# Patient Record
Sex: MALE | Race: WHITE | Employment: OTHER | URBAN - METROPOLITAN AREA
[De-identification: names, ages, dates, MRNs, and addresses within clinical notes are randomized per-mention and may not be internally consistent; named-entity substitution may affect disease eponyms.]

---

## 2021-07-14 DIAGNOSIS — M25.551 RIGHT HIP PAIN: Primary | ICD-10-CM

## 2021-07-15 ENCOUNTER — OFFICE VISIT (OUTPATIENT)
Dept: ORTHOPEDIC SURGERY | Age: 76
End: 2021-07-15
Payer: MEDICARE

## 2021-07-15 VITALS
DIASTOLIC BLOOD PRESSURE: 80 MMHG | HEART RATE: 59 BPM | WEIGHT: 265 LBS | RESPIRATION RATE: 16 BRPM | HEIGHT: 69 IN | TEMPERATURE: 98.3 F | BODY MASS INDEX: 39.25 KG/M2 | SYSTOLIC BLOOD PRESSURE: 148 MMHG

## 2021-07-15 DIAGNOSIS — M16.11 PRIMARY OSTEOARTHRITIS OF RIGHT HIP: Primary | ICD-10-CM

## 2021-07-15 PROCEDURE — G8427 DOCREV CUR MEDS BY ELIG CLIN: HCPCS | Performed by: ORTHOPAEDIC SURGERY

## 2021-07-15 PROCEDURE — G8417 CALC BMI ABV UP PARAM F/U: HCPCS | Performed by: ORTHOPAEDIC SURGERY

## 2021-07-15 PROCEDURE — 99203 OFFICE O/P NEW LOW 30 MIN: CPT | Performed by: ORTHOPAEDIC SURGERY

## 2021-07-15 PROCEDURE — 4040F PNEUMOC VAC/ADMIN/RCVD: CPT | Performed by: ORTHOPAEDIC SURGERY

## 2021-07-15 PROCEDURE — 3017F COLORECTAL CA SCREEN DOC REV: CPT | Performed by: ORTHOPAEDIC SURGERY

## 2021-07-15 PROCEDURE — 4004F PT TOBACCO SCREEN RCVD TLK: CPT | Performed by: ORTHOPAEDIC SURGERY

## 2021-07-15 PROCEDURE — 1123F ACP DISCUSS/DSCN MKR DOCD: CPT | Performed by: ORTHOPAEDIC SURGERY

## 2021-07-15 RX ORDER — ASPIRIN 81 MG/1
81 TABLET, CHEWABLE ORAL DAILY
Status: ON HOLD | COMMUNITY
End: 2021-09-02 | Stop reason: SDUPTHER

## 2021-07-15 RX ORDER — POTASSIUM CHLORIDE 20 MEQ/1
20 TABLET, EXTENDED RELEASE ORAL 2 TIMES DAILY
COMMUNITY

## 2021-07-15 RX ORDER — TIZANIDINE 2 MG/1
2 TABLET ORAL EVERY 6 HOURS PRN
COMMUNITY

## 2021-07-15 RX ORDER — PANTOPRAZOLE SODIUM 40 MG/1
40 TABLET, DELAYED RELEASE ORAL DAILY
COMMUNITY

## 2021-07-15 RX ORDER — DIPHENHYDRAMINE HCL 25 MG
25 CAPSULE ORAL EVERY 6 HOURS PRN
COMMUNITY

## 2021-07-15 RX ORDER — FLUOXETINE HYDROCHLORIDE 20 MG/1
20 CAPSULE ORAL DAILY
COMMUNITY

## 2021-07-15 RX ORDER — OXYBUTYNIN CHLORIDE 10 MG/1
10 TABLET, EXTENDED RELEASE ORAL DAILY
COMMUNITY

## 2021-07-15 RX ORDER — ALBUTEROL SULFATE 0.63 MG/3ML
1 SOLUTION RESPIRATORY (INHALATION) EVERY 6 HOURS PRN
COMMUNITY
End: 2021-08-19

## 2021-07-15 RX ORDER — AMLODIPINE BESYLATE 10 MG/1
10 TABLET ORAL DAILY
COMMUNITY

## 2021-07-15 RX ORDER — BISOPROLOL FUMARATE 10 MG/1
10 TABLET ORAL DAILY
COMMUNITY

## 2021-07-15 RX ORDER — ALPRAZOLAM 0.25 MG/1
0.25 TABLET ORAL NIGHTLY PRN
COMMUNITY

## 2021-07-15 RX ORDER — CHLORTHALIDONE 25 MG/1
25 TABLET ORAL DAILY
COMMUNITY

## 2021-07-15 RX ORDER — ATORVASTATIN CALCIUM 40 MG/1
40 TABLET, FILM COATED ORAL DAILY
COMMUNITY

## 2021-07-15 ASSESSMENT — ENCOUNTER SYMPTOMS
COLOR CHANGE: 0
DIARRHEA: 0
APNEA: 0
SHORTNESS OF BREATH: 0
CHEST TIGHTNESS: 0
VOMITING: 0
NAUSEA: 0
ABDOMINAL PAIN: 0
ABDOMINAL DISTENTION: 0
COUGH: 0
CONSTIPATION: 0

## 2021-07-15 NOTE — PROGRESS NOTES
[facility] [address] [facility]  [address] [place] [address]  Dept: [phone]  Dept Fax: [phone]        Right Hip: New Patient    Subjective:     Chief Complaint   Patient presents with    Hip Pain     R Hip Pain      HPI:     Kody Nunes presents with a history of pain in the right hip for 7 weeks. The patient had a Lupron injection into the hip 7 weeks ago. The pain does radiate into the thigh and into the groin. The pain is not present over the lateral aspect of the hip. The pain is most troublesome during ambulatory activity and now limits the patient`s walking distance to shorter distances. The patient is only able to walk around the house and in and out of restaurants. Night pain, which disturbs the patient`s sleep is not a problem. The patient has had to give up some activities such as walking, bicycling, and playing with the grand kids, which has produced a consequent deterioration in quality of life. He has tried Tylenol, Narcotics, a cane/walker, and reduction of activity and reports no improvement. Back pain is present but not as severe as the hip. The patient has not had a cortisone injection. The patient has not tried physical therapy. The patient has not had surgery with the right hip. The patient had his left hip replaced August 2019 by Dr. Barbara Sullivan. The opposite hip is  okay. Knee pain is not noted. The patient has advanced prostate cancer. The patient is here to discuss a total hip replacement. ROS:     Review of Systems   Constitutional: Positive for activity change. Negative for appetite change, fatigue and fever. Respiratory: Negative for apnea, cough, chest tightness and shortness of breath. Cardiovascular: Negative for chest pain, palpitations and leg swelling. Gastrointestinal: Negative for abdominal distention, abdominal pain, constipation, diarrhea, nausea and vomiting.    Genitourinary: Negative for difficulty urinating, dysuria and hematuria. Musculoskeletal: Positive for arthralgias and gait problem. Negative for joint swelling and myalgias. Skin: Negative for color change and rash. Neurological: Negative for dizziness, weakness, numbness and headaches. Psychiatric/Behavioral: Negative for sleep disturbance. Past Medical History:    No past medical history on file. Past Surgical History:    No past surgical history on file. CurrentMedications:   Current Outpatient Medications   Medication Sig Dispense Refill    atorvastatin (LIPITOR) 40 MG tablet Take 40 mg by mouth daily      ciclopirox (LOPROX) 0.77 % cream Apply topically 2 times daily Apply topically 2 times daily.  diphenhydrAMINE (BENADRYL) 25 MG capsule Take 25 mg by mouth every 6 hours as needed for Itching      pantoprazole (PROTONIX) 40 MG tablet Take 40 mg by mouth daily      ALPRAZolam (XANAX) 0.25 MG tablet Take 0.25 mg by mouth nightly as needed for Sleep.  tiZANidine (ZANAFLEX) 2 MG tablet Take 2 mg by mouth every 6 hours as needed      albuterol (ACCUNEB) 0.63 MG/3ML nebulizer solution Take 1 ampule by nebulization every 6 hours as needed for Wheezing      bisoprolol (ZEBETA) 10 MG tablet Take 10 mg by mouth daily      amLODIPine (NORVASC) 10 MG tablet Take 10 mg by mouth daily      chlorthalidone (HYGROTON) 25 MG tablet Take 25 mg by mouth daily      potassium chloride (KLOR-CON M) 20 MEQ extended release tablet Take 20 mEq by mouth 2 times daily      aspirin 81 MG chewable tablet Take 81 mg by mouth daily      metFORMIN (GLUCOPHAGE) 500 MG tablet Take 500 mg by mouth 2 times daily (with meals)      FLUoxetine (PROZAC) 20 MG capsule Take 20 mg by mouth daily      leuprolide (LUPRON) 45 MG injection Inject 45 mg into the muscle once      oxybutynin (DITROPAN-XL) 10 MG extended release tablet Take 10 mg by mouth daily       No current facility-administered medications for this visit.        Allergies: Patient has no known allergies. Social History:   Social History     Socioeconomic History    Marital status:      Spouse name: Not on file    Number of children: Not on file    Years of education: Not on file    Highest education level: Not on file   Occupational History    Not on file   Tobacco Use    Smoking status: Not on file   Substance and Sexual Activity    Alcohol use: Not on file    Drug use: Not on file    Sexual activity: Not on file   Other Topics Concern    Not on file   Social History Narrative    Not on file     Social Determinants of Health     Financial Resource Strain:     Difficulty of Paying Living Expenses:    Food Insecurity:     Worried About Running Out of Food in the Last Year:     [address] in the Last Year:    Transportation Needs:     Lack of Transportation (Medical):  Lack of Transportation (Non-Medical):    Physical Activity:     Days of Exercise per Week:     Minutes of Exercise per Session:    Stress:     Feeling of Stress :    Social Connections:     Frequency of Communication with Friends and Family:     Frequency of Social Gatherings with Friends and Family:     Attends Judaism Services:     Active Member of Clubs or Organizations:     Attends Club or Organization Meetings:     Marital Status:    Intimate Partner Violence:     Fear of Current or Ex-Partner:     Emotionally Abused:     Physically Abused:     Sexually Abused:        Family History:  No family history on file. Vitals:   BP (!) 148/80   Pulse 59   Temp 98.3 °F (36.8 °C)   Resp 16   Ht 5' 9\" (1.753 m)   Wt 265 lb (120.2 kg)   BMI 39.13 kg/m²  Body mass index is 39.13 kg/m². Physical Examination:     Orthopedics:    GENERAL: Alert and oriented X3 in no acute distress. SKIN: Intact without lesions or ulcerations. NEURO: Intact to sensory and motor testing. VASC: Capillary refill is less than 3 seconds.     Right Hip     GEN: Alert and oriented X 3, in no acute distress. GAIT: The patient's gait was observed while entering the exam room and was noted to be antalgic. The extremity is in anatomic alignment. SKIN: Intact without rashes, lesions, or ulcerations. No obvious deformity or swelling. NEURO: The patient responds to light touch throughout right LE. Patellar and Achilles reflexes are 2/4. VASC: The right LE is neurovascularly intact with 2/4 DP and 2/4 PT pulses. Brisk capillary refill. ROM: 90 degrees flexion, 30 degrees abduction, 10 degrees adduction, 0 degrees of internal rotation, 30 degrees of external rotation. MUSC: Strength is 4/5 hip abduction. PALP: The patient is not tender to palpation over the greater trochanter. TEST: Log roll is positive. 1/16 shorter. (+) Stenchfield test. (+) Impingement test. Pain with FADIR and mild pain with DANILO. Negative Trendelenburg test. Negative Ap's test. (+) C sign. No labral clunks. No pain on compression. Assessment:     1. Primary osteoarthritis of right hip      Procedures:    Procedure: no  Radiology:   X-rays taken today were reviewed with the patient. HIP/PELVIS X-RAY    AP and standing AP of the pelvis and supine AP and frog leg lateral of the Right hip. Radiographs were obtained today and demonstrate joint spacing is near complete loss of joint space. The right hip has sclerosis, cysts, and osteophytes. There is no evidence of fracture, dislocation or other significant abnormality. Impression: Severe osteoarthritis of the right hip. Plan:   Treatment : I reviewed the X-ray with the patient. We discussed the etiologies and natural histories of primary osteoarthritis of the right hip. We discussed the various treatment alternatives including anti-inflammatory medications, physical therapy, injections, further imaging studies and as a last result surgery. During today's visit, I explained to the patient that his x-rays show he has bone on bone arthritis of the right hip.  The only thing that is going to predictably work is with a total hip replacement. The patient has elected in proceeding with a right total hip replacement. The risks/benefits/alternatives and potential complications have been discussed with the patient. We also had a long discussion regarding the procedure itself and expectation of the recovery. All questions and concerns with addressed. Patient was instructed to call our office with any question or concerns prior to the procedure occurs. The patient was given a total hip replacement booklet at today's visit. A physical therapy prescription was not given. Patient should return to the clinic one week before surgery for his pre-operative discussion to follow up with  Theron Sanders PA-C. The patient will call the office immediately with any problems. No orders of the defined types were placed in this encounter. No orders of the defined types were placed in this encounter. IGwyn MS, AT, ATC, am scribing for and in the presence of Gonzales Azar D.O.. 7/17/2021  5:39 PM      Electronically signed by Jose Luis Eden DO, on 7/17/2021 at 5:39 PM         IGonzales DO, have personally seen this patient and I have reviewed the CC, PMH, FHX and Social History as provided by other clinical staff. I reassessed the HPI and ROS as scribed by Gwyn Thomas ATC in my presence and it is both accurate and complete. Thereafter, I personally performed the PE, reviewed the imaging and established the DX and POC. I agree with the documentation provided by the . I have reviewed all documentation in its entirety prior to providing my signature indicating agreement. Any areas of disagreement are noted on the chart.     Electronically signed by Jose Luis Eden DO on 7/17/2021 at 5:39 PM

## 2021-07-21 ENCOUNTER — TELEPHONE (OUTPATIENT)
Dept: ORTHOPEDIC SURGERY | Age: 76
End: 2021-07-21

## 2021-07-21 NOTE — TELEPHONE ENCOUNTER
Called and left VM for patient. Gave the option of schedule an apt with Dr Vick Mattson to discuss moving his DELMY over from [facility]. Asked patient to call back with a decision on how they wish to proceed.

## 2021-08-02 ENCOUNTER — OFFICE VISIT (OUTPATIENT)
Dept: ORTHOPEDIC SURGERY | Age: 76
End: 2021-08-02
Payer: MEDICARE

## 2021-08-02 DIAGNOSIS — M25.551 RIGHT HIP PAIN: Primary | ICD-10-CM

## 2021-08-02 PROCEDURE — G8417 CALC BMI ABV UP PARAM F/U: HCPCS | Performed by: ORTHOPAEDIC SURGERY

## 2021-08-02 PROCEDURE — 1123F ACP DISCUSS/DSCN MKR DOCD: CPT | Performed by: ORTHOPAEDIC SURGERY

## 2021-08-02 PROCEDURE — 3017F COLORECTAL CA SCREEN DOC REV: CPT | Performed by: ORTHOPAEDIC SURGERY

## 2021-08-02 PROCEDURE — 4004F PT TOBACCO SCREEN RCVD TLK: CPT | Performed by: ORTHOPAEDIC SURGERY

## 2021-08-02 PROCEDURE — 99213 OFFICE O/P EST LOW 20 MIN: CPT | Performed by: ORTHOPAEDIC SURGERY

## 2021-08-02 PROCEDURE — G8428 CUR MEDS NOT DOCUMENT: HCPCS | Performed by: ORTHOPAEDIC SURGERY

## 2021-08-02 PROCEDURE — 4040F PNEUMOC VAC/ADMIN/RCVD: CPT | Performed by: ORTHOPAEDIC SURGERY

## 2021-08-02 NOTE — PROGRESS NOTES
Alicia Baldwin M.D.            [address] [facility] [address] [facility],[address], [address] [facility]           Dept Phone: [phone]           Dept Fax:  [phone]           Magalis Yadav          Dept Phone: [phone]           Dept Fax:  [phone]      Chief Compliant:  Chief Complaint   Patient presents with    Pain     Rt hip        History of Present Illness: This is a 77 y.o. male who presents to the clinic today for evaluation / follow up of right hip pain. Cheng Barger is a 36-year-old patient seen for evaluation for relatively increased/acute process of right hip pain. He states as well as his wife who is here with him that in the last 2 to 3 months he has had increasing pain to the point where is difficult for her to ambulate or do anything at all. He denies any injury trauma or falls. Denies any radicular symptoms pain is all groin base. Patiently was recently is seen by Dr. Rigo Rossi who had had him scheduled for right total hip arthroplasty. Patient is noted to have had a previous left total of arthroplasty performed per Dr. Segundo Roland at [facility]. He has no complaints with this. Review of Systems   Constitutional: Negative for fever, chills, sweats. Eyes: Negative for changes in vision, or pain. HENT: Negative for ear ache, epistaxis, or sore throat. Respiratory/Cardio: Negative for Chest pain, palpitations, SOB, or cough. Gastrointestinal: Negative for abdominal pain, N/V/D. Genitourinary: Negative for dysuria, frequency, urgency, or hematuria. Neurological: Negative for headache, numbness, or weakness. Integumentary: Negative for rash, itching, laceration, or abrasion. Musculoskeletal: Positive for Pain (Rt hip)       Physical Exam:  Constitutional: Patient is oriented to person, place, and time.  Patient appears well-developed and well nourished. HENT: Negative otherwise noted  Head: Normocephalic and Atraumatic  Nose: Normal  Eyes: Conjunctivae and EOM are normal  Neck: Normal range of motion Neck supple. Respiratory/Cardio: Effort normal. No respiratory distress. Musculoskeletal: Examination of the patient is noted to have a antalgic gait. He has on limited flexion of his hip up to 70 degrees. Internal rotation is limited to about 10 degrees with a positive FADIR. External rotation limited about 25 degrees patient is unable to get his foot up to tie his shoe. He has no trochanteric findings no IT band findings distally. No significant leg length discrepancy is appreciated. Neurological: Patient is alert and oriented to person, place, and time. Normal strenght. No sensory deficit. Skin: Skin is warm and dry  Psychiatric: Behavior is normal. Thought content normal.  Nursing note and vitals reviewed. Labs and Imaging:     XR taken on July 15 of this year showed multiple views of the patient's pelvis and right hip. Patient has a previous left total of arthroplasty component appears to be in good position. Patient is noted to have significant joint space narrowing of the right hip approaching bone-on-bone apposition. He also has cam and pincer osteophytes. No evidence for avascular process. No orders of the defined types were placed in this encounter. Assessment and Plan:  1. Right hip pain    2. Severe DJD right hip  3. Status post left total hip arthroplasty per Dr. Sara White      This is a 77 y.o. male who presents to the clinic today for evaluation / follow up of significant right hip pain/severe DJD right hip    Past History:    Current Outpatient Medications:     atorvastatin (LIPITOR) 40 MG tablet, Take 40 mg by mouth daily, Disp: , Rfl:     ciclopirox (LOPROX) 0.77 % cream, Apply topically 2 times daily Apply topically 2 times daily. , Disp: , Rfl:     diphenhydrAMINE (BENADRYL) 25 MG capsule, Take 25 mg by mouth every 6 hours as needed for Itching, Disp: , Rfl:     pantoprazole (PROTONIX) 40 MG tablet, Take 40 mg by mouth daily, Disp: , Rfl:     ALPRAZolam (XANAX) 0.25 MG tablet, Take 0.25 mg by mouth nightly as needed for Sleep., Disp: , Rfl:     tiZANidine (ZANAFLEX) 2 MG tablet, Take 2 mg by mouth every 6 hours as needed, Disp: , Rfl:     albuterol (ACCUNEB) 0.63 MG/3ML nebulizer solution, Take 1 ampule by nebulization every 6 hours as needed for Wheezing, Disp: , Rfl:     bisoprolol (ZEBETA) 10 MG tablet, Take 10 mg by mouth daily, Disp: , Rfl:     amLODIPine (NORVASC) 10 MG tablet, Take 10 mg by mouth daily, Disp: , Rfl:     chlorthalidone (HYGROTON) 25 MG tablet, Take 25 mg by mouth daily, Disp: , Rfl:     potassium chloride (KLOR-CON M) 20 MEQ extended release tablet, Take 20 mEq by mouth 2 times daily, Disp: , Rfl:     aspirin 81 MG chewable tablet, Take 81 mg by mouth daily, Disp: , Rfl:     metFORMIN (GLUCOPHAGE) 500 MG tablet, Take 500 mg by mouth 2 times daily (with meals), Disp: , Rfl:     FLUoxetine (PROZAC) 20 MG capsule, Take 20 mg by mouth daily, Disp: , Rfl:     leuprolide (LUPRON) 45 MG injection, Inject 45 mg into the muscle once, Disp: , Rfl:     oxybutynin (DITROPAN-XL) 10 MG extended release tablet, Take 10 mg by mouth daily, Disp: , Rfl:   No Known Allergies  Social History     Socioeconomic History    Marital status:      Spouse name: Not on file    Number of children: Not on file    Years of education: Not on file    Highest education level: Not on file   Occupational History    Not on file   Tobacco Use    Smoking status: Not on file   Substance and Sexual Activity    Alcohol use: Not on file    Drug use: Not on file    Sexual activity: Not on file   Other Topics Concern    Not on file   Social History Narrative    Not on file     Social Determinants of Health     Financial Resource Strain:     Difficulty of Paying Living Expenses:    Food Insecurity:     Worried About [address] [facility] in the Last Year:    [address] in the Last Year:    Transportation Needs:     Lack of Transportation (Medical):  Lack of Transportation (Non-Medical):    Physical Activity:     Days of Exercise per Week:     Minutes of Exercise per Session:    Stress:     Feeling of Stress :    Social Connections:     Frequency of Communication with Friends and Family:     Frequency of Social Gatherings with Friends and Family:     Attends Evangelical Services:     Active Member of Clubs or Organizations:     Attends Club or Organization Meetings:     Marital Status:    Intimate Partner Violence:     Fear of Current or Ex-Partner:     Emotionally Abused:     Physically Abused:     Sexually Abused:      No past medical history on file. No past surgical history on file. No family history on file. Plan  She states as well as his wife that he is having significant problems with his hip to the point where he cannot do anything other than sit in the chair. Patient is willing and wishing to have a right total of arthroplasty performed. He is aware of risk and benefits having been through this before on his contralateral left side. Patient does have a history of type 2 diabetes. He is also receiving Lupron injections for his prostate. Patient does wish to proceed with surgery on an urgent basis this is really beginning to affect his daily activities significantly    Provider Attestation:  Palmira Mendoza, personally performed the services described in this documentation. All medical record entries made by the scribe were at my direction and in my presence. I have reviewed the chart and discharge instructions and agree that the records reflect my personal performance and is accurate and complete. Vincent Goodell, MD. 08/02/21      Please note that this chart was generated using voice recognition Dragon dictation software.   Although every effort was made to ensure the accuracy of this automated transcription, some errors in transcription may have occurred.

## 2021-08-19 ENCOUNTER — HOSPITAL ENCOUNTER (OUTPATIENT)
Dept: PREADMISSION TESTING | Age: 76
Discharge: HOME | End: 2021-08-23
Payer: MEDICARE

## 2021-08-19 VITALS
TEMPERATURE: 97.5 F | WEIGHT: 265 LBS | HEART RATE: 60 BPM | BODY MASS INDEX: 39.25 KG/M2 | RESPIRATION RATE: 18 BRPM | SYSTOLIC BLOOD PRESSURE: 131 MMHG | HEIGHT: 69 IN | OXYGEN SATURATION: 97 % | DIASTOLIC BLOOD PRESSURE: 71 MMHG

## 2021-08-19 LAB
-: ABNORMAL
ABO/RH: NORMAL
ABSOLUTE EOS #: 0.2 K/UL (ref 0–0.4)
ABSOLUTE IMMATURE GRANULOCYTE: ABNORMAL K/UL (ref 0–0.3)
ABSOLUTE LYMPH #: 1.8 K/UL (ref 1–4.8)
ABSOLUTE MONO #: 0.6 K/UL (ref 0.1–1.3)
AMORPHOUS: ABNORMAL
ANION GAP SERPL CALCULATED.3IONS-SCNC: 12 MMOL/L (ref 9–17)
ANTIBODY SCREEN: NEGATIVE
ARM BAND NUMBER: NORMAL
BACTERIA: ABNORMAL
BASOPHILS # BLD: 3 % (ref 0–2)
BASOPHILS ABSOLUTE: 0.1 K/UL (ref 0–0.2)
BILIRUBIN URINE: ABNORMAL
BLOOD BANK COMMENT: NORMAL
BUN BLDV-MCNC: 20 MG/DL (ref 8–23)
BUN/CREAT BLD: ABNORMAL (ref 9–20)
CALCIUM SERPL-MCNC: 9.4 MG/DL (ref 8.6–10.4)
CASTS UA: ABNORMAL /LPF
CHLORIDE BLD-SCNC: 103 MMOL/L (ref 98–107)
CO2: 29 MMOL/L (ref 20–31)
COLOR: YELLOW
COMMENT UA: ABNORMAL
CREAT SERPL-MCNC: 1.22 MG/DL (ref 0.7–1.2)
CRYSTALS, UA: ABNORMAL /HPF
DIFFERENTIAL TYPE: ABNORMAL
EOSINOPHILS RELATIVE PERCENT: 4 % (ref 0–4)
EPITHELIAL CELLS UA: ABNORMAL /HPF
EXPIRATION DATE: NORMAL
GFR AFRICAN AMERICAN: >60 ML/MIN
GFR NON-AFRICAN AMERICAN: 58 ML/MIN
GFR SERPL CREATININE-BSD FRML MDRD: ABNORMAL ML/MIN/{1.73_M2}
GFR SERPL CREATININE-BSD FRML MDRD: ABNORMAL ML/MIN/{1.73_M2}
GLUCOSE BLD-MCNC: 178 MG/DL (ref 70–99)
GLUCOSE URINE: ABNORMAL
HCT VFR BLD CALC: 42.9 % (ref 41–53)
HEMOGLOBIN: 14.9 G/DL (ref 13.5–17.5)
IMMATURE GRANULOCYTES: ABNORMAL %
KETONES, URINE: ABNORMAL
LEUKOCYTE ESTERASE, URINE: NEGATIVE
LYMPHOCYTES # BLD: 34 % (ref 24–44)
MCH RBC QN AUTO: 32.5 PG (ref 26–34)
MCHC RBC AUTO-ENTMCNC: 34.7 G/DL (ref 31–37)
MCV RBC AUTO: 93.6 FL (ref 80–100)
MONOCYTES # BLD: 11 % (ref 1–7)
MUCUS: ABNORMAL
NITRITE, URINE: NEGATIVE
NRBC AUTOMATED: ABNORMAL PER 100 WBC
OTHER OBSERVATIONS UA: ABNORMAL
PDW BLD-RTO: 12.9 % (ref 11.5–14.9)
PH UA: 5.5 (ref 5–8)
PLATELET # BLD: 190 K/UL (ref 150–450)
PLATELET ESTIMATE: ABNORMAL
PMV BLD AUTO: 9.2 FL (ref 6–12)
POTASSIUM SERPL-SCNC: 3.4 MMOL/L (ref 3.7–5.3)
PROTEIN UA: NEGATIVE
RBC # BLD: 4.58 M/UL (ref 4.5–5.9)
RBC # BLD: ABNORMAL 10*6/UL
RBC UA: ABNORMAL /HPF
RENAL EPITHELIAL, UA: ABNORMAL /HPF
SEG NEUTROPHILS: 48 % (ref 36–66)
SEGMENTED NEUTROPHILS ABSOLUTE COUNT: 2.5 K/UL (ref 1.3–9.1)
SODIUM BLD-SCNC: 144 MMOL/L (ref 135–144)
SPECIFIC GRAVITY UA: 1.03 (ref 1–1.03)
TRICHOMONAS: ABNORMAL
TURBIDITY: CLEAR
URINE HGB: NEGATIVE
UROBILINOGEN, URINE: NORMAL
WBC # BLD: 5.2 K/UL (ref 3.5–11)
WBC # BLD: ABNORMAL 10*3/UL
WBC UA: ABNORMAL /HPF
YEAST: ABNORMAL

## 2021-08-19 PROCEDURE — 86900 BLOOD TYPING SEROLOGIC ABO: CPT

## 2021-08-19 PROCEDURE — 80048 BASIC METABOLIC PNL TOTAL CA: CPT

## 2021-08-19 PROCEDURE — 36415 COLL VENOUS BLD VENIPUNCTURE: CPT

## 2021-08-19 PROCEDURE — 87641 MR-STAPH DNA AMP PROBE: CPT

## 2021-08-19 PROCEDURE — 86850 RBC ANTIBODY SCREEN: CPT

## 2021-08-19 PROCEDURE — 86901 BLOOD TYPING SEROLOGIC RH(D): CPT

## 2021-08-19 PROCEDURE — 93005 ELECTROCARDIOGRAM TRACING: CPT | Performed by: ANESTHESIOLOGY

## 2021-08-19 PROCEDURE — 85025 COMPLETE CBC W/AUTO DIFF WBC: CPT

## 2021-08-19 PROCEDURE — 81001 URINALYSIS AUTO W/SCOPE: CPT

## 2021-08-19 RX ORDER — CELECOXIB 200 MG/1
200 CAPSULE ORAL DAILY
Status: ON HOLD | COMMUNITY
End: 2021-09-02 | Stop reason: HOSPADM

## 2021-08-19 RX ORDER — IPRATROPIUM BROMIDE 42 UG/1
2 SPRAY, METERED NASAL 4 TIMES DAILY PRN
COMMUNITY

## 2021-08-19 RX ORDER — HYDROCODONE BITARTRATE AND ACETAMINOPHEN 5; 325 MG/1; MG/1
1 TABLET ORAL EVERY 6 HOURS PRN
Status: ON HOLD | COMMUNITY
End: 2021-09-02 | Stop reason: HOSPADM

## 2021-08-19 ASSESSMENT — ENCOUNTER SYMPTOMS
RESPIRATORY NEGATIVE: 1
GASTROINTESTINAL NEGATIVE: 1

## 2021-08-19 NOTE — PROGRESS NOTES
Dr. Elise Rider, anesthesia, was contacted and informed of the patient's planned surgery, history, and unconfirmed abnormal EKG results from EKG done today in [facility]. Medical clearance required. Surgery scheduling will notify Dr. Theodore Son office who will be responsible for making sure the clearance is obtained and is in the chart for surgery.

## 2021-08-19 NOTE — H&P
[id number]       NAME:  Ron Fonseca  MRN: [id number]   YOB: 1945   Date: 8/19/2021   Age: 77 y.o. Gender: male     COMPLAINT AND PRESENT HISTORY:   Ron Fonseca is 77 y.o.,  male, presents for pre-anesthesia/admission testing for HIP TOTAL ARTHROPLASTY W/ASI Right. per Dr. Radha Temple    Primary dx: DJD RIGHT HIP     HPI:  Patient C/O of pain, stiffness, no popping and cracking in the right Hip with limitation in the ROM. Pt describes his pain as sharp pain radiated to his right  lateral thigh , pain rated 9/10. Pt denies numbness or tingling in his right leg. The Hip does not lock up, No recent falls or trauma. No redness, swelling or rashes. Pt stats, pain started in the last May but it getting worse after he had a lupron injection for prostate cancer into his right hip 7 weeks ago. Pt states he was very active walking, bicycling and playing with his grand kids but now he could not do it. Pain aggravated by walking or standing even around the house, pain decrease by sitting. Pt used oxycodone PRN without any improvement of the pain. Pt refused cortisone injection and he want to do the surgery, pt had steroid injection in his left hip which it did not improve his pain for long time and end up with left hip replacement 2019. Pt denies fever/chills, chest pain or SOB,no palpitation or headache. Testing completed r/t condition:  XR HIP RIGHT MIN 4VW W PELVIS  Narrative   HIP/PELVIS X-RAY       AP and standing AP of the pelvis and supine AP and frog leg lateral of the    Right hip. Radiographs were obtained today and demonstrate joint spacing    is near complete loss of joint space. The right hip has sclerosis, cysts,    and osteophytes. There is no evidence of fracture, dislocation or other    significant abnormality.         Impression: Severe osteoarthritis of the right hip.          Review of additional significant medical hx:  Asthma ,Basal cell carcinoma of face ,GERD, HTN, Hyperlipidemia    Sleep apnea (bipap ), Malignant neoplasm of prostate, diabetes mellitus type two    Medication r/t condition:  atorvastatin (LIPITOR) 40 MG tablet, pantoprazole (PROTONIX) 40 MG tablet, albuterol (ACCUNEB) 0.63 MG/3ML nebulizer solution,  amLODIPine (NORVASC) 10 MG tablet, aspirin 81 MG chewable tablet,  metFORMIN (GLUCOPHAGE) 500 MG tablet    Denies hx of MRSA infection. Denies hx of blood clots legs/ lungs  Denies hx of any personal or family hx of complications w/anesthesia. Pt is on anticoagulant medication : baby aspirin 81 mg       BP Readings from Last 3 Encounters:   08/19/21 131/71   07/15/21 (!) 148/80   11/13/15 (!) 147/92     Pulse Readings from Last 3 Encounters:   08/19/21 60   07/15/21 59   11/13/15 97       Clearance requested per anesthesiologist:   Dr. Damian Spears, anesthesia, was contacted and informed of the patient's planned surgery, history, and unconfirmed abnormal EKG results from EKG done today in [facility]. Medical clearance required. Surgery scheduling will notify Dr. Efrain Lo office who will be responsible for making sure the clearance is obtained and is in the chart for surgery.     PAST MEDICAL HISTORY     Past Medical History:   Diagnosis Date    Anxiety     Arthritis     Cancer [facility])     prostate cancer, radiation treatment    Chronic back pain     Depression     DJD (degenerative joint disease)     GERD (gastroesophageal reflux disease)     Hyperlipidemia     Hypertension     Irregular heart beat     Overactive bladder     Pre-diabetes     Skin cancer, basal cell     Sleep apnea     Bipap nightly       SURGICAL HISTORY       Past Surgical History:   Procedure Laterality Date    COLONOSCOPY      ENDOSCOPY, COLON, DIAGNOSTIC      EGD    JOINT REPLACEMENT Left 2019    total hip    KNEE ARTHROSCOPY Left     LIPOMA RESECTION      x 3    PROSTATE BIOPSY      SKIN BIOPSY      x 2    VASECTOMY spermacelectomy       SOCIAL HISTORY       Social History     Socioeconomic History    Marital status:      Spouse name: None    Number of children: None    Years of education: None    Highest education level: None   Occupational History    None   Tobacco Use    Smoking status: Former Smoker     Quit date:      Years since quittin.6    Smokeless tobacco: Never Used   Substance and Sexual Activity    Alcohol use: Yes     Comment: 4 times weekly    Drug use: None     Comment: medical marijuana card (CBD & THC oil)    Sexual activity: None   Other Topics Concern    None   Social History Narrative    None     Social Determinants of Health     Financial Resource Strain:     Difficulty of Paying Living Expenses:    Food Insecurity:     Worried About Running Out of Food in the Last Year:     Ran Out of Food in the Last Year:    Transportation Needs:     Lack of Transportation (Medical):  Lack of Transportation (Non-Medical):    Physical Activity:     Days of Exercise per Week:     Minutes of Exercise per Session:    Stress:     Feeling of Stress :    Social Connections:     Frequency of Communication with Friends and Family:     Frequency of Social Gatherings with Friends and Family:     Attends Denominational Services:     Active Member of Clubs or Organizations:     Attends Club or Organization Meetings:     Marital Status:    Intimate Partner Violence:     Fear of Current or Ex-Partner:     Emotionally Abused:     Physically Abused:     Sexually Abused:        REVIEW OF SYSTEMS    No Known Allergies    Current Outpatient Medications on File Prior to Encounter   Medication Sig Dispense Refill    celecoxib (CELEBREX) 200 MG capsule Take 200 mg by mouth daily      HYDROcodone-acetaminophen (NORCO) 5-325 MG per tablet Take 1 tablet by mouth every 6 hours as needed for Pain.       ipratropium (ATROVENT) 0.06 % nasal spray 2 sprays by Each Nostril route 4 times daily as needed for Rhinitis      atorvastatin (LIPITOR) 40 MG tablet Take 40 mg by mouth daily      ciclopirox (LOPROX) 0.77 % cream Apply topically 2 times daily Apply topically 2 times daily.  diphenhydrAMINE (BENADRYL) 25 MG capsule Take 25 mg by mouth every 6 hours as needed for Itching      pantoprazole (PROTONIX) 40 MG tablet Take 40 mg by mouth daily      ALPRAZolam (XANAX) 0.25 MG tablet Take 0.25 mg by mouth nightly as needed for Sleep.  tiZANidine (ZANAFLEX) 2 MG tablet Take 2 mg by mouth every 6 hours as needed      bisoprolol (ZEBETA) 10 MG tablet Take 10 mg by mouth daily      amLODIPine (NORVASC) 10 MG tablet Take 10 mg by mouth daily      chlorthalidone (HYGROTON) 25 MG tablet Take 25 mg by mouth daily      potassium chloride (KLOR-CON M) 20 MEQ extended release tablet Take 20 mEq by mouth 2 times daily      aspirin 81 MG chewable tablet Take 81 mg by mouth daily      metFORMIN (GLUCOPHAGE) 500 MG tablet Take 500 mg by mouth 2 times daily (with meals)      FLUoxetine (PROZAC) 20 MG capsule Take 20 mg by mouth daily      leuprolide (LUPRON) 45 MG injection Inject 45 mg into the muscle once      oxybutynin (DITROPAN-XL) 10 MG extended release tablet Take 10 mg by mouth daily       No current facility-administered medications on file prior to encounter. Review of Systems   Constitutional: Positive for activity change. HENT: Negative. Respiratory: Negative. Sleep apnea , using BiPAP mache at night    Cardiovascular: Negative. Pt has history of  arrhythmia due to decreased potasium    Gastrointestinal: Negative. Genitourinary: Negative. Musculoskeletal: Positive for gait problem. Right hip pain    Skin: Negative. Has hx of Basal cell carcinoma of face   Neurological: Positive for dizziness. Negative for numbness. Hematological: Negative. Psychiatric/Behavioral: Positive for agitation.        GENERAL PHYSICAL EXAM     Vitals: /71   Pulse 60 Temp 97.5 °F (36.4 °C)   Resp 18   Ht 5' 9\" (1.753 m)   Wt 265 lb (120.2 kg)   SpO2 97%   BMI 39.13 kg/m²               Physical Exam  Constitutional:       Appearance: Normal appearance. HENT:      Head: Normocephalic. Right Ear: External ear normal.      Left Ear: External ear normal.      Nose: Nose normal.      Mouth/Throat:      Mouth: Mucous membranes are moist.   Eyes:      General:         Right eye: No discharge. Left eye: No discharge. Pupils: Pupils are equal, round, and reactive to light. Cardiovascular:      Rate and Rhythm: Normal rate and regular rhythm. Pulses: Normal pulses. Radial pulses are 2+ on the right side and 2+ on the left side. Dorsalis pedis pulses are 2+ on the right side and 2+ on the left side. Posterior tibial pulses are 2+ on the right side and 2+ on the left side. Heart sounds: Normal heart sounds. No murmur heard. No gallop. Pulmonary:      Effort: Pulmonary effort is normal.      Breath sounds: Normal breath sounds. No wheezing or rales. Abdominal:      General: Bowel sounds are normal. There is no distension. Tenderness: There is no abdominal tenderness. Comments: Obese     Musculoskeletal:         General: Normal range of motion. Cervical back: Normal range of motion and neck supple. Right lower leg: No edema. Left lower leg: No edema. Comments: Tenderness on motion/ rotation of the Rt Hip joint with limitation in the ROM. Pt is using a cane    Skin:     General: Skin is warm and dry. Findings: No bruising or erythema. Neurological:      General: No focal deficit present. Mental Status: He is alert and oriented to person, place, and time.    Psychiatric:         Mood and Affect: Mood normal.         Behavior: Behavior normal.       LAB REVIEW     Lab Results   Component Value Date    WBC 5.2 08/19/2021    HGB 14.9 08/19/2021    HCT 42.9 08/19/2021    MCV 93.6 08/19/2021  08/19/2021     Lab Results   Component Value Date     08/19/2021    K 3.4 08/19/2021     08/19/2021    CO2 29 08/19/2021    BUN 20 08/19/2021    CREATININE 1.22 08/19/2021    GLUCOSE 178 08/19/2021    GLUCOSE 130 03/20/2012    CALCIUM 9.4 08/19/2021          SURGERY / PROVISIONAL DIAGNOSES:      DJD RIGHT HIP   HIP TOTAL ARTHROPLASTY W/ASI  RIGHT    There are no problems to display for this patient.         Patricio Farooq, APRN - CNP on 8/19/2021 at 9:16 AM

## 2021-08-19 NOTE — H&P (VIEW-ONLY)
[facility]       NAME:  Cesar Guzman  MRN: [id number]   YOB: 1945   Date: 8/19/2021   Age: 77 y.o. Gender: male     COMPLAINT AND PRESENT HISTORY:   Cesar Guzman is 77 y.o.,  male, presents for pre-anesthesia/admission testing for HIP TOTAL ARTHROPLASTY W/ASI Right. per Dr. Elly Bhat    Primary dx: DJD RIGHT HIP     HPI:  Patient C/O of pain, stiffness, no popping and cracking in the right Hip with limitation in the ROM. Pt describes his pain as sharp pain radiated to his right  lateral thigh , pain rated 9/10. Pt denies numbness or tingling in his right leg. The Hip does not lock up, No recent falls or trauma. No redness, swelling or rashes. Pt stats, pain started in the last May but it getting worse after he had a lupron injection for prostate cancer into his right hip 7 weeks ago. Pt states he was very active walking, bicycling and playing with his grand kids but now he could not do it. Pain aggravated by walking or standing even around the house, pain decrease by sitting. Pt used oxycodone PRN without any improvement of the pain. Pt refused cortisone injection and he want to do the surgery, pt had steroid injection in his left hip which it did not improve his pain for long time and end up with left hip replacement 2019. Pt denies fever/chills, chest pain or SOB,no palpitation or headache. Testing completed r/t condition:  XR HIP RIGHT MIN 4VW W PELVIS  Narrative   HIP/PELVIS X-RAY       AP and standing AP of the pelvis and supine AP and frog leg lateral of the    Right hip. Radiographs were obtained today and demonstrate joint spacing    is near complete loss of joint space. The right hip has sclerosis, cysts,    and osteophytes. There is no evidence of fracture, dislocation or other    significant abnormality.         Impression: Severe osteoarthritis of the right hip.          Review of additional significant medical hx:  Asthma ,Basal cell carcinoma of face ,GERD, HTN, Hyperlipidemia    Sleep apnea (bipap ), Malignant neoplasm of prostate, diabetes mellitus type two    Medication r/t condition:  atorvastatin (LIPITOR) 40 MG tablet, pantoprazole (PROTONIX) 40 MG tablet, albuterol (ACCUNEB) 0.63 MG/3ML nebulizer solution,  amLODIPine (NORVASC) 10 MG tablet, aspirin 81 MG chewable tablet,  metFORMIN (GLUCOPHAGE) 500 MG tablet    Denies hx of MRSA infection. Denies hx of blood clots legs/ lungs  Denies hx of any personal or family hx of complications w/anesthesia. Pt is on anticoagulant medication : baby aspirin 81 mg       BP Readings from Last 3 Encounters:   08/19/21 131/71   07/15/21 (!) 148/80   11/13/15 (!) 147/92     Pulse Readings from Last 3 Encounters:   08/19/21 60   07/15/21 59   11/13/15 97       Clearance requested per anesthesiologist:   Dr. Keith Alcantara, anesthesia, was contacted and informed of the patient's planned surgery, history, and unconfirmed abnormal EKG results from EKG done today in [facility]. Medical clearance required. Surgery scheduling will notify Dr. Stephanie Warren office who will be responsible for making sure the clearance is obtained and is in the chart for surgery.     PAST MEDICAL HISTORY     Past Medical History:   Diagnosis Date    Anxiety     Arthritis     Cancer [facility])     prostate cancer, radiation treatment    Chronic back pain     Depression     DJD (degenerative joint disease)     GERD (gastroesophageal reflux disease)     Hyperlipidemia     Hypertension     Irregular heart beat     Overactive bladder     Pre-diabetes     Skin cancer, basal cell     Sleep apnea     Bipap nightly       SURGICAL HISTORY       Past Surgical History:   Procedure Laterality Date    COLONOSCOPY      ENDOSCOPY, COLON, DIAGNOSTIC      EGD    JOINT REPLACEMENT Left 2019    total hip    KNEE ARTHROSCOPY Left     LIPOMA RESECTION      x 3    PROSTATE BIOPSY      SKIN BIOPSY      x 2    VASECTOMY spermacelectomy       SOCIAL HISTORY       Social History     Socioeconomic History    Marital status:      Spouse name: None    Number of children: None    Years of education: None    Highest education level: None   Occupational History    None   Tobacco Use    Smoking status: Former Smoker     Quit date:      Years since quittin.6    Smokeless tobacco: Never Used   Substance and Sexual Activity    Alcohol use: Yes     Comment: 4 times weekly    Drug use: None     Comment: medical marijuana card (CBD & THC oil)    Sexual activity: None   Other Topics Concern    None   Social History Narrative    None     Social Determinants of Health     Financial Resource Strain:     Difficulty of Paying Living Expenses:    Food Insecurity:     Worried About Running Out of Food in the Last Year:     Ran Out of Food in the Last Year:    Transportation Needs:     Lack of Transportation (Medical):  Lack of Transportation (Non-Medical):    Physical Activity:     Days of Exercise per Week:     Minutes of Exercise per Session:    Stress:     Feeling of Stress :    Social Connections:     Frequency of Communication with Friends and Family:     Frequency of Social Gatherings with Friends and Family:     Attends Confucianist Services:     Active Member of Clubs or Organizations:     Attends Club or Organization Meetings:     Marital Status:    Intimate Partner Violence:     Fear of Current or Ex-Partner:     Emotionally Abused:     Physically Abused:     Sexually Abused:        REVIEW OF SYSTEMS    No Known Allergies    Current Outpatient Medications on File Prior to Encounter   Medication Sig Dispense Refill    celecoxib (CELEBREX) 200 MG capsule Take 200 mg by mouth daily      HYDROcodone-acetaminophen (NORCO) 5-325 MG per tablet Take 1 tablet by mouth every 6 hours as needed for Pain.       ipratropium (ATROVENT) 0.06 % nasal spray 2 sprays by Each Nostril route 4 times daily as needed for Rhinitis      atorvastatin (LIPITOR) 40 MG tablet Take 40 mg by mouth daily      ciclopirox (LOPROX) 0.77 % cream Apply topically 2 times daily Apply topically 2 times daily.  diphenhydrAMINE (BENADRYL) 25 MG capsule Take 25 mg by mouth every 6 hours as needed for Itching      pantoprazole (PROTONIX) 40 MG tablet Take 40 mg by mouth daily      ALPRAZolam (XANAX) 0.25 MG tablet Take 0.25 mg by mouth nightly as needed for Sleep.  tiZANidine (ZANAFLEX) 2 MG tablet Take 2 mg by mouth every 6 hours as needed      bisoprolol (ZEBETA) 10 MG tablet Take 10 mg by mouth daily      amLODIPine (NORVASC) 10 MG tablet Take 10 mg by mouth daily      chlorthalidone (HYGROTON) 25 MG tablet Take 25 mg by mouth daily      potassium chloride (KLOR-CON M) 20 MEQ extended release tablet Take 20 mEq by mouth 2 times daily      aspirin 81 MG chewable tablet Take 81 mg by mouth daily      metFORMIN (GLUCOPHAGE) 500 MG tablet Take 500 mg by mouth 2 times daily (with meals)      FLUoxetine (PROZAC) 20 MG capsule Take 20 mg by mouth daily      leuprolide (LUPRON) 45 MG injection Inject 45 mg into the muscle once      oxybutynin (DITROPAN-XL) 10 MG extended release tablet Take 10 mg by mouth daily       No current facility-administered medications on file prior to encounter. Review of Systems   Constitutional: Positive for activity change. HENT: Negative. Respiratory: Negative. Sleep apnea , using BiPAP mache at night    Cardiovascular: Negative. Pt has history of  arrhythmia due to decreased potasium    Gastrointestinal: Negative. Genitourinary: Negative. Musculoskeletal: Positive for gait problem. Right hip pain    Skin: Negative. Has hx of Basal cell carcinoma of face   Neurological: Positive for dizziness. Negative for numbness. Hematological: Negative. Psychiatric/Behavioral: Positive for agitation.        GENERAL PHYSICAL EXAM     Vitals: /71   Pulse 60 Temp 97.5 °F (36.4 °C)   Resp 18   Ht 5' 9\" (1.753 m)   Wt 265 lb (120.2 kg)   SpO2 97%   BMI 39.13 kg/m²               Physical Exam  Constitutional:       Appearance: Normal appearance. HENT:      Head: Normocephalic. Right Ear: External ear normal.      Left Ear: External ear normal.      Nose: Nose normal.      Mouth/Throat:      Mouth: Mucous membranes are moist.   Eyes:      General:         Right eye: No discharge. Left eye: No discharge. Pupils: Pupils are equal, round, and reactive to light. Cardiovascular:      Rate and Rhythm: Normal rate and regular rhythm. Pulses: Normal pulses. Radial pulses are 2+ on the right side and 2+ on the left side. Dorsalis pedis pulses are 2+ on the right side and 2+ on the left side. Posterior tibial pulses are 2+ on the right side and 2+ on the left side. Heart sounds: Normal heart sounds. No murmur heard. No gallop. Pulmonary:      Effort: Pulmonary effort is normal.      Breath sounds: Normal breath sounds. No wheezing or rales. Abdominal:      General: Bowel sounds are normal. There is no distension. Tenderness: There is no abdominal tenderness. Comments: Obese     Musculoskeletal:         General: Normal range of motion. Cervical back: Normal range of motion and neck supple. Right lower leg: No edema. Left lower leg: No edema. Comments: Tenderness on motion/ rotation of the Rt Hip joint with limitation in the ROM. Pt is using a cane    Skin:     General: Skin is warm and dry. Findings: No bruising or erythema. Neurological:      General: No focal deficit present. Mental Status: He is alert and oriented to person, place, and time.    Psychiatric:         Mood and Affect: Mood normal.         Behavior: Behavior normal.       LAB REVIEW     Lab Results   Component Value Date    WBC 5.2 08/19/2021    HGB 14.9 08/19/2021    HCT 42.9 08/19/2021    MCV 93.6 08/19/2021  08/19/2021     Lab Results   Component Value Date     08/19/2021    K 3.4 08/19/2021     08/19/2021    CO2 29 08/19/2021    BUN 20 08/19/2021    CREATININE 1.22 08/19/2021    GLUCOSE 178 08/19/2021    GLUCOSE 130 03/20/2012    CALCIUM 9.4 08/19/2021          SURGERY / PROVISIONAL DIAGNOSES:      DJD RIGHT HIP   HIP TOTAL ARTHROPLASTY W/ASI  RIGHT    There are no problems to display for this patient.         Mariam Holt, APRN - CNP on 8/19/2021 at 9:16 AM

## 2021-08-20 LAB
EKG ATRIAL RATE: 62 BPM
EKG P AXIS: 44 DEGREES
EKG P-R INTERVAL: 160 MS
EKG Q-T INTERVAL: 448 MS
EKG QRS DURATION: 84 MS
EKG QTC CALCULATION (BAZETT): 454 MS
EKG R AXIS: -4 DEGREES
EKG T AXIS: -28 DEGREES
EKG VENTRICULAR RATE: 62 BPM
MRSA, DNA, NASAL: NORMAL
SPECIMEN DESCRIPTION: NORMAL

## 2021-09-01 ENCOUNTER — ANESTHESIA EVENT (OUTPATIENT)
Dept: OPERATING ROOM | Age: 76
End: 2021-09-01
Payer: MEDICARE

## 2021-09-02 ENCOUNTER — APPOINTMENT (OUTPATIENT)
Dept: GENERAL RADIOLOGY | Age: 76
End: 2021-09-02
Attending: ORTHOPAEDIC SURGERY
Payer: MEDICARE

## 2021-09-02 ENCOUNTER — HOSPITAL ENCOUNTER (OUTPATIENT)
Age: 76
Discharge: HOME | End: 2021-09-02
Attending: ORTHOPAEDIC SURGERY | Admitting: ORTHOPAEDIC SURGERY
Payer: MEDICARE

## 2021-09-02 ENCOUNTER — ANESTHESIA (OUTPATIENT)
Dept: OPERATING ROOM | Age: 76
End: 2021-09-02
Payer: MEDICARE

## 2021-09-02 VITALS — OXYGEN SATURATION: 99 % | DIASTOLIC BLOOD PRESSURE: 60 MMHG | TEMPERATURE: 95.4 F | SYSTOLIC BLOOD PRESSURE: 131 MMHG

## 2021-09-02 VITALS
BODY MASS INDEX: 39.25 KG/M2 | DIASTOLIC BLOOD PRESSURE: 72 MMHG | HEIGHT: 69 IN | HEART RATE: 75 BPM | RESPIRATION RATE: 16 BRPM | TEMPERATURE: 97.3 F | SYSTOLIC BLOOD PRESSURE: 140 MMHG | WEIGHT: 265 LBS | OXYGEN SATURATION: 93 %

## 2021-09-02 DIAGNOSIS — M16.11 PRIMARY OSTEOARTHRITIS OF RIGHT HIP: Primary | ICD-10-CM

## 2021-09-02 LAB
GLUCOSE BLD-MCNC: 176 MG/DL (ref 75–110)
GLUCOSE BLD-MCNC: 261 MG/DL (ref 75–110)

## 2021-09-02 PROCEDURE — 2580000003 HC RX 258: Performed by: ORTHOPAEDIC SURGERY

## 2021-09-02 PROCEDURE — 3600000003 HC SURGERY LEVEL 3 BASE: Performed by: ORTHOPAEDIC SURGERY

## 2021-09-02 PROCEDURE — 2580000003 HC RX 258: Performed by: ANESTHESIOLOGY

## 2021-09-02 PROCEDURE — 2500000003 HC RX 250 WO HCPCS

## 2021-09-02 PROCEDURE — 97535 SELF CARE MNGMENT TRAINING: CPT

## 2021-09-02 PROCEDURE — 7100000000 HC PACU RECOVERY - FIRST 15 MIN: Performed by: ORTHOPAEDIC SURGERY

## 2021-09-02 PROCEDURE — 6360000002 HC RX W HCPCS

## 2021-09-02 PROCEDURE — 97530 THERAPEUTIC ACTIVITIES: CPT

## 2021-09-02 PROCEDURE — 2500000003 HC RX 250 WO HCPCS: Performed by: ORTHOPAEDIC SURGERY

## 2021-09-02 PROCEDURE — 3700000001 HC ADD 15 MINUTES (ANESTHESIA): Performed by: ORTHOPAEDIC SURGERY

## 2021-09-02 PROCEDURE — 6360000002 HC RX W HCPCS: Performed by: ORTHOPAEDIC SURGERY

## 2021-09-02 PROCEDURE — 97110 THERAPEUTIC EXERCISES: CPT

## 2021-09-02 PROCEDURE — C1776 JOINT DEVICE (IMPLANTABLE): HCPCS | Performed by: ORTHOPAEDIC SURGERY

## 2021-09-02 PROCEDURE — 6370000000 HC RX 637 (ALT 250 FOR IP): Performed by: ORTHOPAEDIC SURGERY

## 2021-09-02 PROCEDURE — 3600000013 HC SURGERY LEVEL 3 ADDTL 15MIN: Performed by: ORTHOPAEDIC SURGERY

## 2021-09-02 PROCEDURE — 3700000000 HC ANESTHESIA ATTENDED CARE: Performed by: ORTHOPAEDIC SURGERY

## 2021-09-02 PROCEDURE — 82947 ASSAY GLUCOSE BLOOD QUANT: CPT

## 2021-09-02 PROCEDURE — 2720000010 HC SURG SUPPLY STERILE: Performed by: ORTHOPAEDIC SURGERY

## 2021-09-02 PROCEDURE — 2709999900 HC NON-CHARGEABLE SUPPLY: Performed by: ORTHOPAEDIC SURGERY

## 2021-09-02 PROCEDURE — 97116 GAIT TRAINING THERAPY: CPT

## 2021-09-02 PROCEDURE — 3209999900 FLUORO FOR SURGICAL PROCEDURES

## 2021-09-02 PROCEDURE — 27130 TOTAL HIP ARTHROPLASTY: CPT | Performed by: ORTHOPAEDIC SURGERY

## 2021-09-02 PROCEDURE — 97161 PT EVAL LOW COMPLEX 20 MIN: CPT

## 2021-09-02 PROCEDURE — 97165 OT EVAL LOW COMPLEX 30 MIN: CPT

## 2021-09-02 PROCEDURE — 7100000001 HC PACU RECOVERY - ADDTL 15 MIN: Performed by: ORTHOPAEDIC SURGERY

## 2021-09-02 PROCEDURE — C1781 MESH (IMPLANTABLE): HCPCS | Performed by: ORTHOPAEDIC SURGERY

## 2021-09-02 DEVICE — LINER ACET SZ G DIA40MM HIP NEUT ARCOMXL G7: Type: IMPLANTABLE DEVICE | Site: HIP | Status: FUNCTIONAL

## 2021-09-02 DEVICE — COCR FEM HD 40MM TYPE 1 +3MM: Type: IMPLANTABLE DEVICE | Site: HIP | Status: FUNCTIONAL

## 2021-09-02 DEVICE — IMPLANTABLE DEVICE
Type: IMPLANTABLE DEVICE | Site: HIP | Status: FUNCTIONAL
Brand: TAPERLOC COMPLETE MICRO PRIMARY FEMORAL

## 2021-09-02 DEVICE — G7 FINNED 4 HOLE SHELL 58G: Type: IMPLANTABLE DEVICE | Site: HIP | Status: FUNCTIONAL

## 2021-09-02 RX ORDER — LIDOCAINE HYDROCHLORIDE 10 MG/ML
INJECTION, SOLUTION EPIDURAL; INFILTRATION; INTRACAUDAL; PERINEURAL PRN
Status: DISCONTINUED | OUTPATIENT
Start: 2021-09-02 | End: 2021-09-02 | Stop reason: SDUPTHER

## 2021-09-02 RX ORDER — SCOLOPAMINE TRANSDERMAL SYSTEM 1 MG/1
1 PATCH, EXTENDED RELEASE TRANSDERMAL ONCE
Status: DISCONTINUED | OUTPATIENT
Start: 2021-09-02 | End: 2021-09-02

## 2021-09-02 RX ORDER — FENTANYL CITRATE 50 UG/ML
25 INJECTION, SOLUTION INTRAMUSCULAR; INTRAVENOUS EVERY 5 MIN PRN
Status: DISCONTINUED | OUTPATIENT
Start: 2021-09-02 | End: 2021-09-02 | Stop reason: HOSPADM

## 2021-09-02 RX ORDER — SODIUM CHLORIDE 0.9 % (FLUSH) 0.9 %
10 SYRINGE (ML) INJECTION PRN
Status: DISCONTINUED | OUTPATIENT
Start: 2021-09-02 | End: 2021-09-02 | Stop reason: HOSPADM

## 2021-09-02 RX ORDER — LIDOCAINE HYDROCHLORIDE 10 MG/ML
1 INJECTION, SOLUTION EPIDURAL; INFILTRATION; INTRACAUDAL; PERINEURAL
Status: DISCONTINUED | OUTPATIENT
Start: 2021-09-02 | End: 2021-09-02 | Stop reason: HOSPADM

## 2021-09-02 RX ORDER — ACETAMINOPHEN 500 MG
1000 TABLET ORAL ONCE
Status: COMPLETED | OUTPATIENT
Start: 2021-09-02 | End: 2021-09-02

## 2021-09-02 RX ORDER — OXYCODONE HYDROCHLORIDE AND ACETAMINOPHEN 5; 325 MG/1; MG/1
1-2 TABLET ORAL EVERY 4 HOURS PRN
Qty: 42 TABLET | Refills: 0 | Status: SHIPPED | OUTPATIENT
Start: 2021-09-02 | End: 2021-09-09

## 2021-09-02 RX ORDER — ONDANSETRON 4 MG/1
4 TABLET, ORALLY DISINTEGRATING ORAL EVERY 8 HOURS PRN
Status: DISCONTINUED | OUTPATIENT
Start: 2021-09-02 | End: 2021-09-02 | Stop reason: HOSPADM

## 2021-09-02 RX ORDER — OXYCODONE HYDROCHLORIDE 10 MG/1
10 TABLET ORAL EVERY 4 HOURS PRN
Status: DISCONTINUED | OUTPATIENT
Start: 2021-09-02 | End: 2021-09-02 | Stop reason: HOSPADM

## 2021-09-02 RX ORDER — SODIUM CHLORIDE 9 MG/ML
25 INJECTION, SOLUTION INTRAVENOUS PRN
Status: DISCONTINUED | OUTPATIENT
Start: 2021-09-02 | End: 2021-09-02 | Stop reason: HOSPADM

## 2021-09-02 RX ORDER — SODIUM CHLORIDE, SODIUM LACTATE, POTASSIUM CHLORIDE, CALCIUM CHLORIDE 600; 310; 30; 20 MG/100ML; MG/100ML; MG/100ML; MG/100ML
INJECTION, SOLUTION INTRAVENOUS CONTINUOUS
Status: DISCONTINUED | OUTPATIENT
Start: 2021-09-02 | End: 2021-09-02

## 2021-09-02 RX ORDER — DIPHENHYDRAMINE HYDROCHLORIDE 50 MG/ML
12.5 INJECTION INTRAMUSCULAR; INTRAVENOUS
Status: DISCONTINUED | OUTPATIENT
Start: 2021-09-02 | End: 2021-09-02 | Stop reason: HOSPADM

## 2021-09-02 RX ORDER — HYDROMORPHONE HCL 110MG/55ML
PATIENT CONTROLLED ANALGESIA SYRINGE INTRAVENOUS PRN
Status: DISCONTINUED | OUTPATIENT
Start: 2021-09-02 | End: 2021-09-02 | Stop reason: SDUPTHER

## 2021-09-02 RX ORDER — ROCURONIUM BROMIDE 10 MG/ML
INJECTION, SOLUTION INTRAVENOUS PRN
Status: DISCONTINUED | OUTPATIENT
Start: 2021-09-02 | End: 2021-09-02 | Stop reason: SDUPTHER

## 2021-09-02 RX ORDER — DEXAMETHASONE SODIUM PHOSPHATE 10 MG/ML
10 INJECTION, SOLUTION INTRAMUSCULAR; INTRAVENOUS ONCE
Status: COMPLETED | OUTPATIENT
Start: 2021-09-02 | End: 2021-09-02

## 2021-09-02 RX ORDER — CALCIUM CHLORIDE 100 MG/ML
INJECTION INTRAVENOUS; INTRAVENTRICULAR PRN
Status: DISCONTINUED | OUTPATIENT
Start: 2021-09-02 | End: 2021-09-02 | Stop reason: ALTCHOICE

## 2021-09-02 RX ORDER — OXYCODONE HYDROCHLORIDE 5 MG/1
5 TABLET ORAL EVERY 4 HOURS PRN
Status: DISCONTINUED | OUTPATIENT
Start: 2021-09-02 | End: 2021-09-02 | Stop reason: HOSPADM

## 2021-09-02 RX ORDER — SUCCINYLCHOLINE/SOD CL,ISO/PF 200MG/10ML
SYRINGE (ML) INTRAVENOUS PRN
Status: DISCONTINUED | OUTPATIENT
Start: 2021-09-02 | End: 2021-09-02 | Stop reason: SDUPTHER

## 2021-09-02 RX ORDER — ACETAMINOPHEN 325 MG/1
650 TABLET ORAL EVERY 6 HOURS SCHEDULED
Status: DISCONTINUED | OUTPATIENT
Start: 2021-09-02 | End: 2021-09-02 | Stop reason: HOSPADM

## 2021-09-02 RX ORDER — SODIUM CHLORIDE, SODIUM LACTATE, POTASSIUM CHLORIDE, CALCIUM CHLORIDE 600; 310; 30; 20 MG/100ML; MG/100ML; MG/100ML; MG/100ML
INJECTION, SOLUTION INTRAVENOUS CONTINUOUS
Status: DISCONTINUED | OUTPATIENT
Start: 2021-09-02 | End: 2021-09-02 | Stop reason: HOSPADM

## 2021-09-02 RX ORDER — FENTANYL CITRATE 50 UG/ML
50 INJECTION, SOLUTION INTRAMUSCULAR; INTRAVENOUS EVERY 5 MIN PRN
Status: DISCONTINUED | OUTPATIENT
Start: 2021-09-02 | End: 2021-09-02 | Stop reason: HOSPADM

## 2021-09-02 RX ORDER — DIPHENHYDRAMINE HYDROCHLORIDE 50 MG/ML
INJECTION INTRAMUSCULAR; INTRAVENOUS PRN
Status: DISCONTINUED | OUTPATIENT
Start: 2021-09-02 | End: 2021-09-02 | Stop reason: SDUPTHER

## 2021-09-02 RX ORDER — LABETALOL HYDROCHLORIDE 5 MG/ML
5 INJECTION, SOLUTION INTRAVENOUS EVERY 10 MIN PRN
Status: DISCONTINUED | OUTPATIENT
Start: 2021-09-02 | End: 2021-09-02 | Stop reason: HOSPADM

## 2021-09-02 RX ORDER — SODIUM CHLORIDE 9 MG/ML
INJECTION, SOLUTION INTRAVENOUS CONTINUOUS
Status: DISCONTINUED | OUTPATIENT
Start: 2021-09-02 | End: 2021-09-02 | Stop reason: HOSPADM

## 2021-09-02 RX ORDER — FENTANYL CITRATE 50 UG/ML
INJECTION, SOLUTION INTRAMUSCULAR; INTRAVENOUS PRN
Status: DISCONTINUED | OUTPATIENT
Start: 2021-09-02 | End: 2021-09-02 | Stop reason: SDUPTHER

## 2021-09-02 RX ORDER — ASPIRIN 81 MG/1
81 TABLET ORAL 2 TIMES DAILY
Status: DISCONTINUED | OUTPATIENT
Start: 2021-09-02 | End: 2021-09-02 | Stop reason: HOSPADM

## 2021-09-02 RX ORDER — OXYCODONE HYDROCHLORIDE AND ACETAMINOPHEN 5; 325 MG/1; MG/1
1 TABLET ORAL PRN
Status: DISCONTINUED | OUTPATIENT
Start: 2021-09-02 | End: 2021-09-02 | Stop reason: HOSPADM

## 2021-09-02 RX ORDER — OXYCODONE HYDROCHLORIDE AND ACETAMINOPHEN 5; 325 MG/1; MG/1
TABLET ORAL
COMMUNITY
Start: 2021-06-17

## 2021-09-02 RX ORDER — ONDANSETRON 2 MG/ML
4 INJECTION INTRAMUSCULAR; INTRAVENOUS EVERY 6 HOURS PRN
Status: DISCONTINUED | OUTPATIENT
Start: 2021-09-02 | End: 2021-09-02 | Stop reason: HOSPADM

## 2021-09-02 RX ORDER — ONDANSETRON 2 MG/ML
4 INJECTION INTRAMUSCULAR; INTRAVENOUS
Status: DISCONTINUED | OUTPATIENT
Start: 2021-09-02 | End: 2021-09-02 | Stop reason: HOSPADM

## 2021-09-02 RX ORDER — TRANEXAMIC ACID 100 MG/ML
INJECTION, SOLUTION INTRAVENOUS PRN
Status: DISCONTINUED | OUTPATIENT
Start: 2021-09-02 | End: 2021-09-02 | Stop reason: SDUPTHER

## 2021-09-02 RX ORDER — SENNA AND DOCUSATE SODIUM 50; 8.6 MG/1; MG/1
1 TABLET, FILM COATED ORAL 2 TIMES DAILY
Status: DISCONTINUED | OUTPATIENT
Start: 2021-09-02 | End: 2021-09-02 | Stop reason: HOSPADM

## 2021-09-02 RX ORDER — SODIUM CHLORIDE 0.9 % (FLUSH) 0.9 %
10 SYRINGE (ML) INJECTION EVERY 12 HOURS SCHEDULED
Status: DISCONTINUED | OUTPATIENT
Start: 2021-09-02 | End: 2021-09-02 | Stop reason: HOSPADM

## 2021-09-02 RX ORDER — EPHEDRINE SULFATE/0.9% NACL/PF 50 MG/5 ML
SYRINGE (ML) INTRAVENOUS PRN
Status: DISCONTINUED | OUTPATIENT
Start: 2021-09-02 | End: 2021-09-02 | Stop reason: SDUPTHER

## 2021-09-02 RX ORDER — OXYCODONE HYDROCHLORIDE AND ACETAMINOPHEN 5; 325 MG/1; MG/1
2 TABLET ORAL PRN
Status: DISCONTINUED | OUTPATIENT
Start: 2021-09-02 | End: 2021-09-02 | Stop reason: HOSPADM

## 2021-09-02 RX ORDER — PROPOFOL 10 MG/ML
INJECTION, EMULSION INTRAVENOUS PRN
Status: DISCONTINUED | OUTPATIENT
Start: 2021-09-02 | End: 2021-09-02 | Stop reason: SDUPTHER

## 2021-09-02 RX ORDER — ONDANSETRON 2 MG/ML
INJECTION INTRAMUSCULAR; INTRAVENOUS PRN
Status: DISCONTINUED | OUTPATIENT
Start: 2021-09-02 | End: 2021-09-02 | Stop reason: SDUPTHER

## 2021-09-02 RX ORDER — MIDAZOLAM HYDROCHLORIDE 1 MG/ML
INJECTION INTRAMUSCULAR; INTRAVENOUS PRN
Status: DISCONTINUED | OUTPATIENT
Start: 2021-09-02 | End: 2021-09-02 | Stop reason: SDUPTHER

## 2021-09-02 RX ORDER — ASPIRIN 81 MG/1
81 TABLET, CHEWABLE ORAL 2 TIMES DAILY
Qty: 30 TABLET | Refills: 3 | Status: SHIPPED | OUTPATIENT
Start: 2021-09-02

## 2021-09-02 RX ORDER — GABAPENTIN 600 MG/1
600 TABLET ORAL ONCE
Status: COMPLETED | OUTPATIENT
Start: 2021-09-02 | End: 2021-09-02

## 2021-09-02 RX ADMIN — TRANEXAMIC ACID 1000 MG: 100 INJECTION, SOLUTION INTRAVENOUS at 09:18

## 2021-09-02 RX ADMIN — DIPHENHYDRAMINE HYDROCHLORIDE 12.5 MG: 50 INJECTION, SOLUTION INTRAMUSCULAR; INTRAVENOUS at 09:38

## 2021-09-02 RX ADMIN — Medication 3000 MG: at 15:55

## 2021-09-02 RX ADMIN — TRANEXAMIC ACID 1000 MG: 100 INJECTION, SOLUTION INTRAVENOUS at 10:53

## 2021-09-02 RX ADMIN — GABAPENTIN 600 MG: 600 TABLET, FILM COATED ORAL at 08:29

## 2021-09-02 RX ADMIN — FENTANYL CITRATE 50 MCG: 50 INJECTION, SOLUTION INTRAMUSCULAR; INTRAVENOUS at 09:06

## 2021-09-02 RX ADMIN — Medication 10 MG: at 11:00

## 2021-09-02 RX ADMIN — Medication 3000 MG: at 09:15

## 2021-09-02 RX ADMIN — HYDROMORPHONE HYDROCHLORIDE 0.5 MG: 2 INJECTION, SOLUTION INTRAMUSCULAR; INTRAVENOUS; SUBCUTANEOUS at 11:21

## 2021-09-02 RX ADMIN — ONDANSETRON 4 MG: 2 INJECTION, SOLUTION INTRAMUSCULAR; INTRAVENOUS at 11:07

## 2021-09-02 RX ADMIN — HYDROMORPHONE HYDROCHLORIDE 0.5 MG: 2 INJECTION, SOLUTION INTRAMUSCULAR; INTRAVENOUS; SUBCUTANEOUS at 11:11

## 2021-09-02 RX ADMIN — ROCURONIUM BROMIDE 5 MG: 10 INJECTION, SOLUTION INTRAVENOUS at 09:06

## 2021-09-02 RX ADMIN — SODIUM CHLORIDE: 9 INJECTION, SOLUTION INTRAVENOUS at 09:00

## 2021-09-02 RX ADMIN — ACETAMINOPHEN 1000 MG: 500 TABLET ORAL at 08:28

## 2021-09-02 RX ADMIN — FENTANYL CITRATE 50 MCG: 50 INJECTION, SOLUTION INTRAMUSCULAR; INTRAVENOUS at 10:36

## 2021-09-02 RX ADMIN — FENTANYL CITRATE 50 MCG: 50 INJECTION, SOLUTION INTRAMUSCULAR; INTRAVENOUS at 09:51

## 2021-09-02 RX ADMIN — ROCURONIUM BROMIDE 45 MG: 10 INJECTION, SOLUTION INTRAVENOUS at 09:22

## 2021-09-02 RX ADMIN — PROPOFOL 170 MG: 10 INJECTION, EMULSION INTRAVENOUS at 09:06

## 2021-09-02 RX ADMIN — FENTANYL CITRATE 50 MCG: 50 INJECTION, SOLUTION INTRAMUSCULAR; INTRAVENOUS at 09:36

## 2021-09-02 RX ADMIN — MIDAZOLAM 1 MG: 1 INJECTION INTRAMUSCULAR; INTRAVENOUS at 08:58

## 2021-09-02 RX ADMIN — LIDOCAINE HYDROCHLORIDE 50 MG: 10 INJECTION, SOLUTION EPIDURAL; INFILTRATION; INTRACAUDAL; PERINEURAL at 09:06

## 2021-09-02 RX ADMIN — SODIUM CHLORIDE: 9 INJECTION, SOLUTION INTRAVENOUS at 09:23

## 2021-09-02 RX ADMIN — DEXAMETHASONE SODIUM PHOSPHATE 10 MG: 10 INJECTION, SOLUTION INTRAMUSCULAR; INTRAVENOUS at 08:47

## 2021-09-02 RX ADMIN — SUGAMMADEX 300 MG: 100 INJECTION, SOLUTION INTRAVENOUS at 11:10

## 2021-09-02 RX ADMIN — ROCURONIUM BROMIDE 20 MG: 10 INJECTION, SOLUTION INTRAVENOUS at 10:29

## 2021-09-02 RX ADMIN — Medication 10 MG: at 10:21

## 2021-09-02 RX ADMIN — Medication 140 MG: at 09:06

## 2021-09-02 ASSESSMENT — PULMONARY FUNCTION TESTS
PIF_VALUE: 24
PIF_VALUE: 3
PIF_VALUE: 25
PIF_VALUE: 25
PIF_VALUE: 23
PIF_VALUE: 24
PIF_VALUE: 23
PIF_VALUE: 23
PIF_VALUE: 24
PIF_VALUE: 0
PIF_VALUE: 26
PIF_VALUE: 25
PIF_VALUE: 25
PIF_VALUE: 22
PIF_VALUE: 23
PIF_VALUE: 24
PIF_VALUE: 23
PIF_VALUE: 24
PIF_VALUE: 24
PIF_VALUE: 25
PIF_VALUE: 24
PIF_VALUE: 28
PIF_VALUE: 24
PIF_VALUE: 23
PIF_VALUE: 23
PIF_VALUE: 20
PIF_VALUE: 22
PIF_VALUE: 25
PIF_VALUE: 28
PIF_VALUE: 24
PIF_VALUE: 25
PIF_VALUE: 23
PIF_VALUE: 24
PIF_VALUE: 26
PIF_VALUE: 21
PIF_VALUE: 23
PIF_VALUE: 23
PIF_VALUE: 24
PIF_VALUE: 24
PIF_VALUE: 26
PIF_VALUE: 24
PIF_VALUE: 27
PIF_VALUE: 24
PIF_VALUE: 28
PIF_VALUE: 24
PIF_VALUE: 7
PIF_VALUE: 25
PIF_VALUE: 23
PIF_VALUE: 0
PIF_VALUE: 24
PIF_VALUE: 5
PIF_VALUE: 25
PIF_VALUE: 1
PIF_VALUE: 24
PIF_VALUE: 23
PIF_VALUE: 22
PIF_VALUE: 24
PIF_VALUE: 0
PIF_VALUE: 18
PIF_VALUE: 9
PIF_VALUE: 25
PIF_VALUE: 24
PIF_VALUE: 26
PIF_VALUE: 23
PIF_VALUE: 24
PIF_VALUE: 19
PIF_VALUE: 23
PIF_VALUE: 24
PIF_VALUE: 25
PIF_VALUE: 24
PIF_VALUE: 24
PIF_VALUE: 27
PIF_VALUE: 1
PIF_VALUE: 1
PIF_VALUE: 24
PIF_VALUE: 18
PIF_VALUE: 23
PIF_VALUE: 1
PIF_VALUE: 22
PIF_VALUE: 21
PIF_VALUE: 25
PIF_VALUE: 20
PIF_VALUE: 24
PIF_VALUE: 23
PIF_VALUE: 24
PIF_VALUE: 23
PIF_VALUE: 4
PIF_VALUE: 24
PIF_VALUE: 22
PIF_VALUE: 25
PIF_VALUE: 24
PIF_VALUE: 1
PIF_VALUE: 25
PIF_VALUE: 24
PIF_VALUE: 23
PIF_VALUE: 24
PIF_VALUE: 23
PIF_VALUE: 24
PIF_VALUE: 10
PIF_VALUE: 24
PIF_VALUE: 22
PIF_VALUE: 24
PIF_VALUE: 25
PIF_VALUE: 24
PIF_VALUE: 30
PIF_VALUE: 0
PIF_VALUE: 27

## 2021-09-02 ASSESSMENT — PAIN DESCRIPTION - ORIENTATION
ORIENTATION: RIGHT
ORIENTATION: RIGHT

## 2021-09-02 ASSESSMENT — PAIN SCALES - GENERAL
PAINLEVEL_OUTOF10: 0
PAINLEVEL_OUTOF10: 3
PAINLEVEL_OUTOF10: 3
PAINLEVEL_OUTOF10: 0
PAINLEVEL_OUTOF10: 0
PAINLEVEL_OUTOF10: 1

## 2021-09-02 ASSESSMENT — PAIN DESCRIPTION - LOCATION
LOCATION: HIP
LOCATION: HIP

## 2021-09-02 ASSESSMENT — PAIN - FUNCTIONAL ASSESSMENT: PAIN_FUNCTIONAL_ASSESSMENT: 0-10

## 2021-09-02 ASSESSMENT — PAIN DESCRIPTION - PAIN TYPE
TYPE: SURGICAL PAIN
TYPE: SURGICAL PAIN

## 2021-09-02 NOTE — CARE COORDINATION
[facility] Discharge Planning Note:    Admission Date:  9/2/2021 Kody Nunes is a 77 y.o.  male    Admitted for : Primary osteoarthritis of right hip [M16.11]    Met with:  Patient    PCP:  Adelof Mar MD              Insurance:  Medicare    Current Residence/ Living Arrangements:  independently at home             Current Services PTA:  No    Is patient agreeable to [employer]: Yes    Is patient agreeable to outpatient physical therapy:  Yes    Freedom of choice provided: Yes         [employer] Agency/Outpatient Therapy chosen:  Will choose    Potential [facility] needed: No    Current home DME:  walker    Pharmacy:  [facility]    Does Patient want to use MED[facility] to BEDS?([facility] only) Yes    Transportation Provider:  Family                       Discharge Plan:   Patient intends to discharge to Home    Patient does not need a wheeled walker. Anticipated discharge date 9/2/21      Readmission Risk              Risk of Unplanned Readmission:  0           Electronically signed by:  Casa Neal RN on 9/2/2021 at 8:35 AM

## 2021-09-02 NOTE — FLOWSHEET NOTE
09/02/21 1646   Encounter Summary   Services provided to: Patient   Referral/Consult From: Jessie   Complexity of Encounter Low   Length of Encounter 15 minutes   Routine   Type Initial   Assessment Sleeping   Intervention Prayer

## 2021-09-02 NOTE — PROGRESS NOTES
CLINICAL PHARMACY NOTE: MEDS TO BEDS    Total # of Prescriptions Filled: 1   The following medications were delivered to the patient:  · Percocet 5/325    Additional Documentation:  Delivered Medication to Patients Room

## 2021-09-02 NOTE — ANESTHESIA PRE PROCEDURE
Department of Anesthesiology  Preprocedure Note       Name:  Goyo Peres   Age:  77 y.o.  :  1945                                          MRN:  [id number]         Date:  2021      Surgeon: Susana Cat):  Eber Cameron MD    Procedure: Procedure(s):  HIP TOTAL ARTHROPLASTY W/ASI    Medications prior to admission:   Prior to Admission medications    Medication Sig Start Date End Date Taking? Authorizing Provider   oxyCODONE-acetaminophen (PERCOCET) 5-325 MG per tablet  21  Yes Historical Provider, MD   celecoxib (CELEBREX) 200 MG capsule Take 200 mg by mouth daily   Yes Historical Provider, MD   atorvastatin (LIPITOR) 40 MG tablet Take 40 mg by mouth daily   Yes Historical Provider, MD   ciclopirox (LOPROX) 0.77 % cream Apply topically 2 times daily Apply topically 2 times daily. Yes Historical Provider, MD   pantoprazole (PROTONIX) 40 MG tablet Take 40 mg by mouth daily   Yes Historical Provider, MD   ALPRAZolam (XANAX) 0.25 MG tablet Take 0.25 mg by mouth nightly as needed for Sleep.    Yes Historical Provider, MD   tiZANidine (ZANAFLEX) 2 MG tablet Take 2 mg by mouth every 6 hours as needed   Yes Historical Provider, MD   bisoprolol (ZEBETA) 10 MG tablet Take 10 mg by mouth daily   Yes Historical Provider, MD   amLODIPine (NORVASC) 10 MG tablet Take 10 mg by mouth daily   Yes Historical Provider, MD   chlorthalidone (HYGROTON) 25 MG tablet Take 25 mg by mouth daily   Yes Historical Provider, MD   potassium chloride (KLOR-CON M) 20 MEQ extended release tablet Take 20 mEq by mouth 2 times daily   Yes Historical Provider, MD   aspirin 81 MG chewable tablet Take 81 mg by mouth daily   Yes Historical Provider, MD   metFORMIN (GLUCOPHAGE) 500 MG tablet Take 500 mg by mouth 2 times daily (with meals)   Yes Historical Provider, MD   FLUoxetine (PROZAC) 20 MG capsule Take 20 mg by mouth daily   Yes Historical Provider, MD   oxybutynin (DITROPAN-XL) 10 MG extended release tablet Take 10 mg by mouth daily   Yes Historical Provider, MD   HYDROcodone-acetaminophen (NORCO) 5-325 MG per tablet Take 1 tablet by mouth every 6 hours as needed for Pain.     Historical Provider, MD   ipratropium (ATROVENT) 0.06 % nasal spray 2 sprays by Each Nostril route 4 times daily as needed for Rhinitis    Historical Provider, MD   diphenhydrAMINE (BENADRYL) 25 MG capsule Take 25 mg by mouth every 6 hours as needed for Itching    Historical Provider, MD   leuprolide (LUPRON) 45 MG injection Inject 45 mg into the muscle once    Historical Provider, MD       Current medications:    Current Facility-Administered Medications   Medication Dose Route Frequency Provider Last Rate Last Admin    ceFAZolin (ANCEF) 3000 mg in dextrose 5 % 100 mL IVPB  3,000 mg IntraVENous On Call to [facility], MD        acetaminophen (TYLENOL) tablet 1,000 mg  1,000 mg Oral Once Linda Ureña MD        dexamethasone (PF) (DECADRON) injection 10 mg  10 mg IntraVENous Once Linda Ureña MD        gabapentin (NEURONTIN) tablet 600 mg  600 mg Oral Once Linda Ureña MD        scopolamine (TRANSDERM-SCOP) transdermal patch 1 patch  1 patch TransDERmal Once Linda Ureña MD        lidocaine PF 1 % injection 1 mL  1 mL IntraDERmal Once PRN David Perdomo MD        0.9 % sodium chloride infusion   IntraVENous Continuous [place] MD Angie           Allergies:  No Known Allergies    Problem List:    Patient Active Problem List   Diagnosis Code    Primary osteoarthritis of right hip M16.11       Past Medical History:        Diagnosis Date    Anxiety     Arthritis     Cancer ([facility] [address]     prostate cancer, radiation treatment    Chronic back pain     Depression     DJD (degenerative joint disease)     GERD (gastroesophageal reflux disease)     Hyperlipidemia     Hypertension     Irregular heart beat     Overactive bladder     Pre-diabetes     Skin cancer, basal cell     Sleep apnea     Bipap nightly       Past Surgical History: results found for: PROTIME, INR, APTT    HCG (If Applicable): No results found for: PREGTESTUR, PREGSERUM, HCG, HCGQUANT     ABGs: No results found for: PHART, PO2ART, MAZ1PPU, AKA5MVL, BEART, V4AFBJCQ     Type & Screen (If Applicable):  No results found for: LABABO, LABRH    Drug/Infectious Status (If Applicable):  No results found for: HIV, HEPCAB    COVID-19 Screening (If Applicable): No results found for: COVID19        Anesthesia Evaluation  Patient summary reviewed and Nursing notes reviewed no history of anesthetic complications:   Airway: Mallampati: III  TM distance: >3 FB   Neck ROM: full  Mouth opening: > = 3 FB Dental: normal exam         Pulmonary:normal exam  breath sounds clear to auscultation  (+) sleep apnea (BiPAP nightly):                             Cardiovascular:    (+) hypertension:, dysrhythmias:,       ECG reviewed  Rhythm: regular  Rate: normal           Beta Blocker:  Dose within 24 Hrs         Neuro/Psych:   (+) psychiatric history:            GI/Hepatic/Renal:   (+) GERD:, renal disease (h/o Prostate Cancer):, morbid obesity          Endo/Other:    (+) : arthritis: OA., malignancy/cancer (Prostate Cancer). ROS comment: Marijuana use Abdominal:             Vascular: negative vascular ROS. Other Findings:           Anesthesia Plan      general     ASA 3       Induction: intravenous. MIPS: Prophylactic antiemetics administered. Anesthetic plan and risks discussed with patient. Plan discussed with CRNA.                   Ubaldo Alvarenga MD   9/2/2021

## 2021-09-02 NOTE — FLOWSHEET NOTE
Discharge instructions reviewed with the patient and his spouse. All question answered. Patient provided with anti em knee hi stockings, primaseal dressing for the VNS dressing change and hsi meds were  Delivered by meds to beds.

## 2021-09-02 NOTE — DISCHARGE INSTR - COC
Continuity of Care Form    Patient Name: Mamie Cartagena   :  1945  MRN:  [id number]    Admit date:  2021  Discharge date:  ***    Code Status Order: Full Code   Advance Directives:   Advance Care Flowsheet Documentation       Date/Time Healthcare Directive Type of Healthcare Directive Copy in [address] Agent's Name Healthcare Agent's Phone Number    21 0813  Yes, patient has an advance directive for healthcare treatment  Health care treatment directive  Yes, copy in chart  --  --  --            Admitting Physician:  Reid Restrepo MD  PCP: Faisal Buckner MD    Discharging Nurse: [facility] Unit/Room#: 43 [facility]  Discharging Unit Phone Number: ***    Emergency Contact:   Extended Emergency Contact Information  Primary Emergency Contact: [facility]  Address: [address] Phone: [phone]  Mobile Phone: [phone]  Relation: Spouse  [facility] Emergency [facility] Phone: [phone]  Relation: Child   needed? No    Past Surgical History:  Past Surgical History:   Procedure Laterality Date    COLONOSCOPY      ENDOSCOPY, COLON, DIAGNOSTIC      EGD    JOINT REPLACEMENT Left 2019    total hip    KNEE ARTHROSCOPY Left     LIPOMA RESECTION      x 3    PROSTATE BIOPSY      SKIN BIOPSY      x 2    VASECTOMY      spermacelectomy       Immunization History: There is no immunization history on file for this patient.     Active Problems:  Patient Active Problem List   Diagnosis Code    Primary osteoarthritis of right hip M16.11       Isolation/Infection:   Isolation            No Isolation          Patient Infection Status       None to display            Nurse Assessment:  Last Vital Signs: /88   Pulse 63   Temp 97.5 °F (36.4 °C) (Infrared)   Resp 18   Ht 5' 9\" (1.753 m)   Wt 265 lb (120.2 kg)   SpO2 96%   BMI 39.13 kg/m²     Last documented pain score (0-10 scale): Pain Level: 0  Last Weight:   Wt Readings from Last 1 Encounters:   09/02/21 265 lb (120.2 kg)     Mental Status:  {IP PT MENTAL STATUS:06742}    IV Access:  { JOHNY IV ACCESS:447813199}    Nursing Mobility/ADLs:  Walking   {CHP DME WVWZ:615035172}  Transfer  {CHP DME MMEI:415841760}  Bathing  {CHP DME WYLU:165180474}  Dressing  {CHP DME OCEH:066776151}  Toileting  {CHP DME XAOP:591934693}  Feeding  {CHP DME ADVL:366409814}  Med Admin  {CHP DME HIUW:836298546}  Med Delivery   { JOHNY MED Delivery:081864345}    Wound Care Documentation and Therapy:        Elimination:  Continence:   · Bowel: {YES / IV:15290}  · Bladder: {YES / ZP:13589}  Urinary Catheter: {Urinary Catheter:935046864}   Colostomy/Ileostomy/Ileal Conduit: {YES / NO:81894}       Date of Last BM: ***  No intake or output data in the 24 hours ending 09/02/21 0833  No intake/output data recorded. Safety Concerns:     [address] Safety Concerns:435541591}    Impairments/Disabilities:      [address] Impairments/Disabilities:183902776}    Nutrition Therapy:  Current Nutrition Therapy:   [address] Diet List:620302617}    Routes of Feeding: {CHP DME Other Feedings:814682833}  Liquids: {Slp liquid thickness:07860}  Daily Fluid Restriction: {CHP DME Yes amt example:733777623}  Last Modified Barium Swallow with Video (Video Swallowing Test): {Done Not Done VMBU:226230244}    Treatments at the Time of Hospital Discharge:   Respiratory Treatments: ***  Oxygen Therapy:  {Therapy; copd oxygen:10611}  Ventilator:    { CC Vent UXDU:235771694}    Rehab Therapies: Physical Therapy and Occupational Therapy  Weight Bearing Status/Restrictions: Other Medical Equipment (for information only, NOT a DME order): Other Treatments: skilled nursing assessment per protocol     TOTAL JOINT [address]  This patient is a post-operative total joint replacement patient.   Expectations for this patients care are as follows:      Goals:   DailyTherapy for rehabilitative purposes for two weeks.  Increased level of activity and ambulation each day.  Well-controlled pain.  Free from infection.  Encourage patient to provide self-care when possible.  Ambulation with a rolling walker. Activity & Diet:   Therapy performed daily. (Instruct patient to take pain meds. 1 hour prior to therapy.)   Up in chair for all meals and majority of day.  Range of motion for TKA patients.  Hip precautions for DELMY patients.  Incentive Spirometer: 3- 4 inhalations every twenty minutes, during the day, while awake, the first week home after discharge   Increase oral intake of fruits, fiber and water and take a daily stool softener to prevent constipation.  Consider stronger bowel protocol if no bowel movement is achieved after three days home.  Increase protein intake and reduce sugar intake to promote healing and prevent infection.  No pillow under the knee for TKA patients. [address] [place] go on in the a.m. and come off in the p.m. (Hand wash them every two or three days, roll in towel to remove most of moisture, and hang to dry.)    Incision Care:   If patient has ACE bandage it may be removed POD #2   Keep incisional dressing intact until seen and removed by surgeon, unless saturated, in which case, call surgeon and request instructions.  If dressing falls off, call surgeon.  If using the Prevena dressing, with battery pack, place battery pack in waterproof bag during shower. If using Aquacel dressing then dressing is waterproof and patient may shower.  If patient has a Prevena remove on POD #5 and replace with Aquacel dressing (patient was provided with dressing in hospital)   Elevated the leg and ice the affected area four times a day, for twenty minutes each time and after every physical therapy session.     Normal Conditions - Will improve with provided comfort measures and time:   Some swelling in the operative leg is normal - this should reduce over time.  Some post-operative pain is normal.  This will improve with prescribed medication, provided comfort measures and time.  Constipation related to pain medications & decreased mobility is a common occurrence. (Increase your fiber & water intake and take a stool softener.)    Slight warmth of operative site is normal and will diminish with time.  Fatigue and moderate pain after therapy is normal and will improve with time.  Numbness near the incision site is normal and will improve with time.  NOTE: Ensure/Remind patient to go to their follow-up appointment with their orthopedic surgeon, which is scheduled: ***    Abnormal Conditions - When to call the Surgeon:   Increasing/excessive redness, warmth or swelling at the incisional site not relieved with ice and elevation.  Increasing/excessive pain not well-controlled by prescribed medications.  Drainage or odor from or around the incision site.  (A little blood showing through the bandage is ok, but active leaking, of any color, coming out of the bandage is NOT normal.)   Temperature above 101 degrees. (A mild temp of 99 - 100 is normal - if temp gets to 101 you may use Tylenol once - if it does not improve, you may use a 2nd dose of Tylenol after 5 hours - if temperature is still at or above 101 degrees then call the surgeon.)   Calf tenderness, swelling, or redness or numbness of the foot/lower leg.  Shortness of breath or chest pain.  Any other incision or surgical-related concerns.    CALL SURGEON with concerns PRIOR TO sending patient to hospital.      Patient's personal belongings (please select all that are sent with patient):  {DAVID DME Belongings:446111617}    RN SIGNATURE:  {Esignature:039644269}    CASE MANAGEMENT/SOCIAL WORK SECTION    Inpatient Status Date: out pt in a bed    Readmission Risk Assessment Score:  Readmission Risk              Risk of Unplanned Readmission: 0           Discharging to [address] [facility]  P: [phone]  F: [phone]    Dialysis Facility (if applicable)   · Name:  · Address:  · Dialysis Schedule:  · Phone:  · Fax:    / signature: Electronically signed by Tala Johnson RN on 9/2/21 at 2:47 PM EDT    PHYSICIAN SECTION    Prognosis: Good    Condition at Discharge: Stable    Rehab Potential (if transferring to Rehab): Good    Recommended Labs or Other Treatments After Discharge:     Physician Certification: I certify the above information and transfer of Claudia Dumont  is necessary for the continuing treatment of the diagnosis listed and that he requires [address] for greater 30 days.      Update Admission H&P: No change in H&P    PHYSICIAN SIGNATURE:  Electronically signed by Kenny Vogel MD on 9/2/21 at 8:54 AM EDT

## 2021-09-02 NOTE — ANESTHESIA POSTPROCEDURE EVALUATION
Department of Anesthesiology  Postprocedure Note    Patient: She Peters  MRN: [id number]  YOB: 1945  Date of evaluation: 9/2/2021  Time:  12:22 PM     Procedure Summary     Date: 09/02/21 Room / Location: [address] [place] [address]  / [facility]     Anesthesia Start: 0900 Anesthesia Stop: 8601    Procedure: HIP TOTAL ARTHROPLASTY W/ASI (Right Hip) Diagnosis: (DJD RIGHT HIP (PT HAS HAD COVID VACCINE DONE))    Surgeons: Leonela Pollock MD Responsible Provider: Nahum Montoya MD    Anesthesia Type: general ASA Status: 3          Anesthesia Type: general    Karen Phase I: Karen Score: 9    Karen Phase II:      Last vitals: Reviewed and per EMR flowsheets.        Anesthesia Post Evaluation    Comments: POST- ANESTHESIA EVALUATION       Pt Name: She Peters  MRN: [id number]  YOB: 1945  Date of evaluation: 9/2/2021  Time:  12:23 PM      /76   Pulse 74   Temp 98 °F (36.7 °C) (Infrared)   Resp 12   Ht 5' 9\" (1.753 m)   Wt 265 lb (120.2 kg)   SpO2 95%   BMI 39.13 kg/m²      Consciousness Level  Awake  Cardiopulmonary Status  Stable  Pain Adequately Treated YES  Nausea / Vomiting  NO  Adequate Hydration  YES  Anesthesia Related Complications NONE      Electronically signed by Nahum Montoya MD on 9/2/2021 at 12:23 PM

## 2021-09-02 NOTE — PROGRESS NOTES
joint replacement (Left, 2019); Colonoscopy; Endoscopy, colon, diagnostic; Knee arthroscopy (Left); Vasectomy; and Total hip arthroplasty (Right, 9/2/2021). Restrictions  Restrictions/Precautions  Restrictions/Precautions: Weight Bearing, Surgical Protocols, Up as Tolerated (weight bearing as tolerated, no straight leg raises)  Required Braces or Orthoses?: No  Implants present? : Metal implants (B DELMY )  Lower Extremity Weight Bearing Restrictions  Right Lower Extremity Weight Bearing: Weight Bearing As Tolerated  Position Activity Restriction  Hip Precautions:  (Anterior Hip precautions)  Other position/activity restrictions: No straight leg raise on surgical hip  Vision/Hearing  Vision: Impaired  Vision Exceptions: Wears glasses at all times  Hearing: Within functional limits     Subjective  General  Chart Reviewed: Yes  Patient assessed for rehabilitation services?: Yes  Additional Pertinent Hx: Patient C/O of pain, stiffness, no popping and cracking in the right Hip with limitation in the ROM. Pt describes his pain as sharp pain radiated to his right  lateral thigh , pain rated 9/10. Pt denies numbness or tingling in his right leg. The Hip does not lock up, No recent falls or trauma. No redness, swelling or rashes. Pt stats, pain started in the last May but it getting worse after he had a lupron injection for prostate cancer into his right hip 7 weeks ago. Pt states he was very active walking, bicycling and playing with his grand kids but now he could not do it. Pain aggravated by walking or standing even around the house, pain decrease by sitting. Pt used oxycodone PRN without any improvement of the pain. Pt refused cortisone injection and he want to do the surgery, pt had steroid injection in his left hip which it did not improve his pain for long time and end up with left hip replacement 2019. Pt denies fever/chills, chest pain or SOB,no palpitation or headache.  Pt. is s/p R DELMY (anterior approach) with Dr. Florian Trevino 9/2/21. Response To Previous Treatment: Not applicable  Family / Caregiver Present: Yes (Wife)  Referring Practitioner: Dr. Florian Trevino  Referral Date : 09/02/21  Diagnosis: Primary osteoarthritis of right hip  Follows Commands: Within Functional Limits  Subjective  Subjective: Pt. and nurse Marisue Denver agreeable for therapy. Pain Screening  Patient Currently in Pain: Yes  Pain Assessment  Pain Assessment: 0-10  Pain Level: 3  Patient's Stated Pain Goal: No pain  Pain Type: Surgical pain  Pain Location: Hip  Pain Orientation: Right  Vital Signs  Patient Currently in Pain: Yes  Oxygen Therapy  SpO2: 96 % (remained 96% after mobilty)  O2 Device: None (Room air)  Patient Observation  Observations: Pt. up in chair eating lunch upon arrival. Pt. with L wrist peripheral IV, which was disconnected by RN prior to mobility. Pt. with portable wound vac (LIDIA dressing) on incision site. Pt. on room air during therapy. Sao2 96% at rest and 96% after mobility.         Orientation  Orientation  Overall Orientation Status: Within Functional Limits  Social/Functional History  Social/Functional History  Lives With: Spouse  Type of Home: House  Home Layout: Multi-level, Bed/Bath upstairs (8 steps (R rail), landing, 8 steps (L rail) to get upstairs)  Home Access: Stairs to enter without rails  Entrance Stairs - Number of Steps: 2  Bathroom Shower/Tub: Walk-in shower  Bathroom Toilet: Standard (handicap height downstairs, standard upstairs)  Bathroom Equipment: Toilet raiser  Bathroom Accessibility: Walker accessible  Home Equipment: Rolling walker, Sock aid, Cane  Receives Help From: Family (Wife)  ADL Assistance: Independent  Homemaking Assistance: Independent  Homemaking Responsibilities: Yes (Cooking)  Ambulation Assistance: Independent  Transfer Assistance: Independent (with rolling walker)  Active : Yes  Mode of Transportation: Car, SUV  Occupation: Retired  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, reports she is availiable to assist if needed. Cognition        Objective          AROM RLE (degrees)  RLE AROM: Exceptions  RLE General AROM: Hip: AROM limited d/t surgical pain, Knee: WFL, Ankle: WFL  AROM LLE (degrees)  LLE AROM : WFL  AROM RUE (degrees)  RUE General AROM: See OT eval  AROM LUE (degrees)  LUE General AROM: See OT eval  Strength RLE  Strength RLE: Exception  Comment: Grossly, Hip: not assessed due to surgical pain, Knee: 4/5, Ankle: 4/5  Strength LLE  Strength LLE: WFL  Comment: Grossly, Hip: 4/5, Knee: 4/5, Ankle: 4/5  Strength RUE  Comment: See OT eval  Strength LUE  Comment: See OT eval     Sensation  Overall Sensation Status: WFL  Bed mobility  Supine to Sit: Contact guard assistance  Sit to Supine: Contact guard assistance  Scooting: Contact guard assistance  Comment: HOB lowered, pt. utilized bed rails, pt. educated on using straight cane handle to lift surgical leg up onto bed during sit to supine  Transfers  Sit to Stand: Contact guard assistance  Stand to sit: Contact guard assistance  Bed to Chair: Contact guard assistance  Comment: Transfers assessed with rolling walker. Pt. requires continuous verbal cues to proper hand placement during transfers. Ambulation  Ambulation?: Yes  Ambulation 1  Surface: level tile  Device: Rolling Walker  Assistance: Contact guard assistance  Distance: 150ft. Comments: Pt. slightly impulsive during ambulation, required verbal cueing to slow down. Pt. tends to walk outside of the walker at times. Otherwise, safe and steady ambulation. Ambulation 2  Surface - 2: level tile  Device 2: Rolling Walker  Assistance 2: Stand by assistance  Quality of Gait 2: Pt is much safer when he slows down. Distance: 150ft. Comments: Pt. tends to walk outside of the walker at times. Otherwise, pt. demonstrated safe and steady ambulation.    Stairs/Curb  Stairs?: Yes  Stairs  # Steps : 6  Stairs Height:  (3 4\" Minutes 58         Timed Code Treatment Minutes: 38 Minutes     PT evaluation/treatment is completed by Geanie Kehr, SPT under the direct supervision of co-signing therapist, who agrees with all documentation and evaluation/treatment.   Geanie Kehr, SPT

## 2021-09-02 NOTE — PROGRESS NOTES
[address] [facility]    Occupational Therapy Evaluation  Date: 21  Patient Name: Kristian Taylor       Room: 2828/4283-22  MRN: [id number]  Account: [de-identified]   : 1945  (72 y.o.) Gender: male     Discharge Recommendations:  Further Occupational Therapy is recommended upon facility discharge. Equipment Needed: Yes    Referring Practitioner: Anuel Aranda MD  Diagnosis: Primary OA Right hip       Treatment Diagnosis: Impaired self care status  Past Medical History:  has a past medical history of Anxiety, Arthritis, Cancer ([address] Chronic back pain, Depression, DJD (degenerative joint disease), GERD (gastroesophageal reflux disease), Hyperlipidemia, Hypertension, Irregular heart beat, Overactive bladder, Pre-diabetes, Skin cancer, basal cell, and Sleep apnea. Past Surgical History:   has a past surgical history that includes skin biopsy; Prostate biopsy; lipoma resection; joint replacement (Left, 2019); Colonoscopy; Endoscopy, colon, diagnostic; Knee arthroscopy (Left); Vasectomy; and Total hip arthroplasty (Right, 2021).     Restrictions  Restrictions/Precautions: Weight Bearing, Surgical Protocols, Up as Tolerated (weight bearing as tolerated, no straight leg raises)  Implants present? : Metal implants (B DELMY )  Hip Precautions: (P)  (Anterior Hip precautions)  Other position/activity restrictions: No straight leg raise on surgical hip  Right Lower Extremity Weight Bearing: Weight Bearing As Tolerated  Required Braces or Orthoses?: No     Vitals  Temp: 97.3 °F (36.3 °C)  Pulse: 75  Resp: 16  BP: (!) 140/72  Height: 5' 9\" (175.3 cm)  Weight: 265 lb (120.2 kg)  BMI (Calculated): 39.2  Oxygen Therapy  SpO2: 93 %  Pulse Oximeter Device Mode: Continuous  Pulse Oximeter Device Location: Left, Finger  O2 Device: None (Room air)  O2 Flow Rate (L/min): 2 L/min  Level of Consciousness: Alert (0)    Subjective  Subjective: Pt sitting in recliner chair with wife in room upon arrival. Pt was pleasant and agreeable to OT/PT eval  Comments: Ok per Chris Co for OT/PT eval   Overall Orientation Status: Within Functional Limits  Vision  Vision: Impaired  Vision Exceptions: Wears glasses at all times  Hearing  Hearing: Within functional limits  Social/Functional History  Lives With: Spouse  Type of Home: House  Home Layout: Multi-level, Bed/Bath upstairs (8 steps (R rail), landing, 8 steps (L rail) to get upstairs)  Home Access: Stairs to enter without rails  Entrance Stairs - Number of Steps: 2  Bathroom Shower/Tub: Walk-in shower  Bathroom Toilet: Standard (handicap height downstairs, standard upstairs)  Bathroom Equipment: Toilet raiser  Bathroom Accessibility: Walker accessible  Home Equipment: Rolling walker, Sock aid, Cane  Receives Help From: Family (Wife)  ADL Assistance: Independent  Homemaking Assistance: Independent  Homemaking Responsibilities: Yes (Cooking)  Ambulation Assistance: Independent  Transfer Assistance: Independent (with rolling walker)  Active : Yes  Mode of Transportation: Car, SUV  Occupation: Retired  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, reports she is availiable to assist if needed. Pain Assessment  Pain Assessment: 0-10  Pain Level: 3  Pain Type: Surgical pain  Pain Location: Hip  Pain Orientation: Right    Objective      Cognition  Safety Judgement: Decreased awareness of need for safety   Sensation  Overall Sensation Status: WFL   ADL  Feeding: Modified independent   Grooming: Contact guard assistance (Hand hygiene while standing sink side)  UE Bathing: Setup  LE Bathing: Minimal assistance  UE Dressing: Setup  LE Dressing: Moderate assistance  Toileting: Contact guard assistance (Standing at toilet to void)  Additional Comments: ADL scores based on clinical reasoning and skilled observation unless otherwise noted. Pt was educated on threading surgical side first with good carryover.  Pt of RLE into bed for safety. Pt demonstrated good understanding and return demo. Transfers  Sit to stand: Contact guard assistance  Stand to sit: Contact guard assistance  Transfer Comments: Verbal cues for hand placement and safety with fair carryover. Functional Activity Tolerance  Functional Activity Tolerance: Tolerates 30 min exercise with multiple rests   Assessment  Performance deficits / Impairments: Decreased ADL status, Decreased functional mobility , Decreased strength, Decreased safe awareness, Decreased endurance, Decreased balance, Decreased high-level IADLs  Treatment Diagnosis: Impaired self care status  Prognosis: Good  Decision Making: Low Complexity  REQUIRES OT FOLLOW UP: Yes  Discharge Recommendations: Patient would benefit from continued therapy after discharge  Activity Tolerance: Patient Tolerated treatment well         Functional Outcome Measures  AM-PAC Daily Activity Inpatient   How much help for putting on and taking off regular lower body clothing?: A Lot  How much help for Bathing?: A Little  How much help for Toileting?: A Little  How much help for putting on and taking off regular upper body clothing?: A Little  How much help for taking care of personal grooming?: A Little  How much help for eating meals?: None  [facility] Raw Score: 18  AM-PAC Inpatient ADL T-Scale Score : 38.66  ADL Inpatient CMS 0-100% Score: 46.65  ADL Inpatient CMS G-Code Modifier : CK       Goals  Patient Goals   Patient goals :  To go home  Short term goals  Time Frame for Short term goals: By discharge  Short term goal 1: Pt will complete lower body dressing/bathing with min A and good safety with use of AE as needed  Short term goal 2: Pt will complete functional transfers/mobility during self care tasks with supervision and good safety with use of least restrictive device  Short term goal 3: Pt will verbalize/demonstrate good understanding of home safety/fall prevention strategies to increase safety and independence with self care tasks. Short term goal 4: Pt will verbalize/demonstrate good understanding of adaptive equipment/adaptive strategies/durable medical equipment to increase safety and independence with self care tasks. Plan  Safety Devices  Safety Devices in place: Yes  Type of devices:  All fall risk precautions in place, Call light within reach, Gait belt, Patient at risk for falls, Left in chair, Nurse notified     Plan  Times per week: 5-7  Current Treatment Recommendations: Self-Care / ADL, Strengthening, Functional Mobility Training, Balance Training, Endurance Training, Safety Education & Training, Patient/Caregiver Education & Training, Equipment Evaluation, Education, & procurement, Home Management Training       Equipment Recommendations  Equipment Needed: Yes  Reacher: x  OT Individual Minutes  Time In: 7314  Time Out: 2849  Minutes: 62    Electronically signed by Ras Guerrero OT on 9/2/21 at 3:40 PM EDT

## 2021-09-02 NOTE — CARE COORDINATION
[address] Encounter Date/Time: 2021 Hung Gong Account: [de-identified]    MRN: [id number]    Patient: Kody Nunes    Contact Serial #: [id number]      ENCOUNTER          Patient Class: OP in a bed Private Enc? No Unit RM BD: Nam Service: Med/Surg   Encounter DX: Primary osteoarthritis o*   ADM Provider: Veda Quevedo MD   Procedure: VT TOTAL HIP ARTHROPLAST*   ATT Provider: Veda Quevedo MD   REF Provider:        Admission DX: Primary osteoarthritis of right hip and DX codes: M16.11      PATIENT                 Name: Kody Nunes : 1945 (75 yrs)   Address: [facility] Sex: Male   City: [place] [address]         Marital Status:    Employer: RETIRED         Confucianism: Other   Primary Care Provider: Adelfo Mar MD         Primary Phone: [phone]   EMERGENCY CONTACT   Contact Name Legal Guardian? Relationship to Patient Home Phone Work Phone   1. Shasta Berrios  2. Oral Poolejanice  No Spouse  Child [phone]              GUARANTOR  Guarantor: Kody Nunes     : 1945   Address: [address] [place] [address] Sex: Male   [facility] [address]     Relation to Patient: Self       Home Phone: [phone]   Guarantor ID: [id number]       Work Phone:     Guarantor Employer: RETIRED         Status: RETIRED      COVERAGE        PRIMARY INSURANCE   Payor: MEDICARE Plan: MEDICARE PART A AND B   Payor Address: [address] [address]       Group Number:   Insurance Type: INDEMNITY   Subscriber Name: Devante Nugent : 1945   Subscriber ID: [id number] Nayan Sangeetha. Rel. to Sub: Self   SECONDARY INSURANCE   Payor: UNITED [employer] Plan: [address]angel[address] 61*   Payor Address:  [facility] [id number], [place], [place] [address]          Group Number:   Insurance Type: INDEMNITY   Subscriber Name: Devante Nugent : 1945   Subscriber ID: [id number] Pat.  Rel. to Sub: SELF

## 2021-09-02 NOTE — OP NOTE
Operative Note      Patient: She Peters  YOB: 1945  MRN: [id number]    Date of Procedure: 9/2/2021    Pre-Op Diagnosis: DJD RIGHT HIP (PT HAS HAD COVID VACCINE DONE)    Post-Op Diagnosis: Same       Procedure(s):  HIP TOTAL ARTHROPLASTY W/ASI    Surgeon(s):  Leonela Pollock MD    Assistant:   Resident: DO José Luis Michael CST  Anesthesia: General    Estimated Blood Loss (mL): 490    Complications: None    Specimens:   * No specimens in log *    Implants:  Implant Name Type Inv. Item Serial No.  Lot No. LRB No. Used Action   G7 FINNED 4 HOLE SHELL 58G  G7 FINNED 4 HOLE SHELL 58G  [employer]- [id number] Right 1 Implanted   LINER ACET SZ G [id number] HIP NEUT ARCOMXL G7  LINER ACET SZ G [id number] HIP NEUT ARCOMXL G7  [employer]- [id number] Right 1 Implanted   IMPL HIP TPRLC 133 MP TYPE1 PPS HO 18.0 Hip IMPL HIP TPRLC 133 MP TYPE1 PPS HO 18.0  [employer]-PMM [id number] Right 1 Implanted   COCR FEM HD 40MM TYPE 1 +3MM  COCR FEM HD 40MM TYPE 1 +3MM  [employer]- [id number] Right 1 Implanted         Drains: * No LDAs found *    Findings: Severe degenerative joint disease right hip    Detailed Description of Procedure:     Patient is a 77 y.o. male with a long standing history of DJD of the right hip. The patient has already previously had a total hip arthroplasty performed on the left side. The patient has failed all types of conservative treatments including physical therapy, NSAIDs, weight loss counseling, and intra-articular steroid injection. The patient's activities of daily living have become significantly restricted. Having failed conservative treatment, the patient has agreed to proceed  with total hip arthroplasty aware of all risks highlighted in the surgical consent form. The patient received 3 grams of Ancef in the holding area and then taken to the operative suite where general anesthesia as administered.  Both lower extremities were prepped and draped for an ASI approach. Time-out was called to verify laterality. The #9 retractor was placed over the proximal femur and with assistance pf fluoroscopy the anterior Incision was centered approximately with reference to the femoral neck. After the incision, the subcutaneous tissue was taken down to the fascia, which was then split with the [place]. The interval between the sartorius and the tensor muscle was identified and retractors positioned. Electrocautery was used to coagulate any of the ascending circumflex vessels in the peritrochanteric area. The femoral neck was palpated and the #7 and #6 retractor placed below and above and the #9 retractor over the acetabular rim. Capsulectomy was carried out in a the routine fashion. With the assistance of fluoroscopy, the previously template osteotomy site was identified with reference to the lesser trochanter. The first cut was made with an osculation saw, then, a second cut at the head/neck junction was performed. The femoral neck ring was then removed with a osteotomy and the femoral head removed with a powered steinmann pin. The head was subsequently measured. Retractors were placed about the acetabulum and labeled resection preformed as well as the removal of any peripheral osteophytes. Appropriate sized acetabular 55 and then subsequently 57 mm reamers were placed and at 40 degrees of abduction and 10 degrees of anteversion until the tear drop attained and good peripheral contact and chatter verified both manually and visually with fluoroscopy. The appropriate size G7 acetabulum was then inserted at the above orientation  and seated with a heavy mallet to the appropriate placement. Fixation was verified. The above mentioned liner was inserted, impacted, and fixation verified.      The proximal femur was exposed by performing a posterior capsular release, dropping the foot off the table  and placement of the leg in figure 4 with proper placement of the # 8 and #5 retractors. The femoral canal was then assessed with a cookie cutter, canal finder, and lateral sizer. Broaching with rasps was carried out in approximately 10-15 degrees of anteversion. This was performed in the increasing sizes until appropriate fit, fill, and rotational stability was obtained. Various offset trunions and head/neck lengths  were trailed until appropriate soft-tissue tensioning, offset, and leg-length was re-established. The trial component were inspected for sizing and positioning with fluoroscopy. Once satisfied, the trial components were removed and the permanent femoral stem was inserted and impacted until no further movement. Re-trailing was carried out, the above sized head/neck combination was placed on the clean-and-dried mouse taper and the hip reduced. Repeat fluoroscopy was carried out. ROM and stability and leg lengths were re-evaluated. The entire hip area was injected with 100ml of the orthopedic cocktail. A Irrisept lavage was carried out for 1 minutes. This was then re-irrigated. The soft tissues were sprayed with platelet rich and platelet poor plasma. Hemostasis was excellent. The fascia was closed with a #1 Strata-Fix, the subcutaneous tissue was closed with a 2-0 Monocryl Strata-Fix, and Zopline for the skin. This was covered with Aquacel dressing. The patient was awakened and taken to PACU in good condition.        Romero Coker MD           Electronically signed by Romero Coker MD on 9/2/2021 at 10:58 AM

## 2021-09-02 NOTE — INTERVAL H&P NOTE
Update History & Physical    The patient's History and Physical of August 19, 2021 was reviewed with the patient and I examined the patient. There was change. Patient had some changes in his lab. Patient is now taking a magnesium supplement. He did obtain surgical clearance. The surgical site was confirmed by the patient and me. Patient will be having:HIP TOTAL ARTHROPLASTY W/ASI - Right. Patient has been NPO since midnight. No blood thinners in the past 5-7 days. Patient denies any personal or family problems with anesthesia. Plan: The risks, benefits, expected outcome, and alternative to the recommended procedure have been discussed with the patient. Patient understands and wants to proceed with the procedure.      Electronically signed by NICKOLAS Sotelo CNP on 9/2/2021 at 7:13 AM

## 2021-09-03 ENCOUNTER — TELEPHONE (OUTPATIENT)
Dept: ORTHOPEDIC SURGERY | Age: 76
End: 2021-09-03

## 2021-09-03 NOTE — TELEPHONE ENCOUNTER
Spoke with Kathleen Moran and told her that I would fax her the patient's discharge summary.  Fax confirmation scanned into chart

## 2021-09-03 NOTE — TELEPHONE ENCOUNTER
09/02 - HIP TOTAL ARTHROPLASTY W/ASI    Patient is now at [facility] facility, they have no orders for this patient. Alexandra Mcduffie the nurse is asking for a verbal order for physical therapy?     Please call  with direction  Thank you

## 2021-09-07 ENCOUNTER — TELEPHONE (OUTPATIENT)
Dept: ORTHOPEDIC SURGERY | Age: 76
End: 2021-09-07

## 2021-09-07 NOTE — TELEPHONE ENCOUNTER
Pt called in stating that he has not heard anything from home health about coming out to do a dressing change. He was told that an order was faxed on Friday. He was given a number for Katina to call to check on the status.

## 2021-09-08 ENCOUNTER — TELEPHONE (OUTPATIENT)
Dept: ORTHOPEDIC SURGERY | Age: 76
End: 2021-09-08

## 2021-09-08 NOTE — TELEPHONE ENCOUNTER
Piter Wynn calling from [facility] [address] to let us know they did PT evaluation today and they will seeing patient twice more this week and then 3 times a week for next few weeks for PT on RTHA

## 2021-09-15 ENCOUNTER — OFFICE VISIT (OUTPATIENT)
Dept: ORTHOPEDIC SURGERY | Age: 76
End: 2021-09-15

## 2021-09-15 DIAGNOSIS — Z96.641 STATUS POST TOTAL REPLACEMENT OF RIGHT HIP: Primary | ICD-10-CM

## 2021-09-15 PROCEDURE — 99024 POSTOP FOLLOW-UP VISIT: CPT | Performed by: ORTHOPAEDIC SURGERY

## 2021-09-15 NOTE — PROGRESS NOTES
Oksana Moreno M.D.            [address] [place] [address]., [address] [facility], [address] [place] [address]           Dept Phone: [phone]           Dept Fax:  [phone] [address] [employer]           Magalis Yadav          Dept Phone: [phone]           Dept Fax:  [phone]      Chief Compliant:  Chief Complaint   Patient presents with    Post-Op Check     Rt ASI 9/2/21        History of Present Illness:  Patient returns today 2 weeks status post right DELMY-ASI. Patient has no major complaints. Review of Systems   Constitutional: Negative for fever, chills, sweats, recent injury, recent illness  Neurological: Negative for Headaches, numbness, or weakness. Integumentary: Negative for rash, itching, ecchymosis, or wounds. Musculoskeletal: Positive for Post-Op Check (Rt ASI 9/2/21)       Physical Exam:  Constitutional: Patient is oriented to person, place, and time. Patient appears well-developed and well nourished. Musculoskeletal: Normal gait. Expected degree of meralgia parasthetica. Expected mild pain with gentle ROM of hip. Calves negative. My's negative. Neurovascular Intact. Leg lengths equal  Neurological: Patient is alert and oriented to person, place, and time. Normal strenght. No sensory deficit. Skin: Skin is warm and dry. ASI incision without redness or drainage. Nursing note and vitals reviewed. Labs and Imaging:     XR taken today:  XR HIP 1 VW W PELVIS RIGHT    Result Date: 9/15/2021  X-rays taken today reviewed by me show standing AP of the pelvis and a lateral the right hip. Patient has had a recent right total of arthroplasty components appear in good position on both views.   Patient had a previous left total hip arthroplasty          Orders Placed This Encounter   Procedures    XR HIP 1 VW W PELVIS RIGHT     Standing Status:   Future     Number of Occurrences:   1     Standing Expiration Date:   9/15/2022       Assessment and Plan:  1. Status post total replacement of right hip            This is a 77 y.o. male who presents to the clinic today 2 weeks status post right DELMY-ASI. Patient to transition from home to outpatient Pt. ASI restrictions given. WBAT with/without assisted devices. Continue anticoagulation protocol. RTO 5-6 weeks. Call if any problems or issues prior to that time. Provider Attestation:  Logan Brock, personally performed the services described in this documentation. All medical record entries made by the scribe were at my direction and in my presence. I have reviewed the chart and discharge instructions and agree that the records reflect my personal performance and is accurate and complete. Branden Bahena MD. 09/15/21        Please note that this chart was generated using voice recognition Dragon dictation software. Although every effort was made to ensure the accuracy of this automated transcription, some errors in transcription may have occurred.

## 2021-10-27 ENCOUNTER — OFFICE VISIT (OUTPATIENT)
Dept: ORTHOPEDIC SURGERY | Age: 76
End: 2021-10-27

## 2021-10-27 DIAGNOSIS — Z96.641 STATUS POST TOTAL REPLACEMENT OF RIGHT HIP: Primary | ICD-10-CM

## 2021-10-27 PROCEDURE — 99024 POSTOP FOLLOW-UP VISIT: CPT | Performed by: ORTHOPAEDIC SURGERY

## 2021-10-27 NOTE — PROGRESS NOTES
Returns today status post right total hip he is on 9-21. He states his hip feels excellent. He states that I am his his hero he would like to get his physical therapy extended but he says basically has 0 pain. Examination notes I can motion the patient's hip indiscriminately without any discomfort whatsoever. He has a negative Stinchfield's. Leg lengths are equal.  Still has a mild element of meralgia paresthetica. No new hip x-rays taken today    Impression  Status post right total hip ASI 9-2 2021    Plan  Patient have his physical therapy extended for an additional 3 weeks. Continue exercise at home after that.   We will see him back here in 3 months or call any problems prior to that time

## 2021-11-02 ENCOUNTER — HOSPITAL ENCOUNTER (OUTPATIENT)
Dept: PHYSICAL THERAPY | Facility: CLINIC | Age: 76
Setting detail: THERAPIES SERIES
Discharge: HOME | End: 2021-11-02
Payer: MEDICARE

## 2021-11-02 PROCEDURE — 97110 THERAPEUTIC EXERCISES: CPT

## 2021-11-02 PROCEDURE — 97161 PT EVAL LOW COMPLEX 20 MIN: CPT

## 2021-11-02 NOTE — CONSULTS
[x] THE [facility]  [facility]   [place]: [phone]  [place]: [phone]      Physical Therapy Lower Extremity Evaluation    Date:  2021  Patient: Debby Apley  : 1945  MRN: [id number]  Physician: Dr Call Fly: Medicare ([address] [place] [address] PENDING)  Medical Diagnosis: RLE DELMY  Rehab Codes: B15.670  Onset date:      Next Dr's appt. : -    Subjective:   CC/HPI:  Pt with RLE DJD for years and pain in the hip. Patient underwent surgery to hip for DELMY on 21 with Dr Leigh Poole. Pt had 3 weeks PT at home initially and then recently went to back to see Dr Leigh Poole to request more PT.      PMHx: [] Unremarkable [] Diabetes [] HTN  [] Pacemaker   [] MI/Heart Problems [] Cancer [] Arthritis   [] Other:              [] Refer to full medical chart  In EPIC     Comorbidities:   [x] Obesity [] Dialysis  [] N/A   [] Asthma/COPD [] Dementia [] Other:   [] Stroke [] Sleep apnea [] Other:   [] Vascular disease [] Rheumatic disease [] Other:       Medications:  [x] Refer to full medical record [] None [] Other:  Allergies:       [x] Refer to full medical record [] None [] Other:    ADL/IADL [x] Previously independent with all [x] Currently independent with all Who currently assists the patient with task     [] Previously independent with all except: [] Currently independent with all except:     Bathing  [] Assist [] Assist     Dress/grooming [] Assist [] Assist     Transfer/mobility [] Assist [] Assist     Feeding [] Assist [] Assist     Toileting [] Assist [] Assist     Driving [] Assist [] Assist     Housekeeping [] Assist [] Assist     Grocery shop/meal prep [] Assist [] Assist          Gait Prior level of function Current level of function    [x] Independent  [] Assist [x] Independent  [] Assist   Device: [] Independent [] Independent    [] Straight Cane [] Quad cane [] Straight Cane [] Quad cane    [] Standard walker [] Rolling walker   [] 4 wheeled walker [] Standard walker [] Rolling walker   [] 4 wheeled walker    [] Wheelchair [] Wheelchair       Marital Status Wife    Home type Split level    Work Activities/duties  Retired  June 2018    Recreational Activities Travel to [place] in winter  Bike, walking        Pain present? No    Location Right side lower back    Pain Rating currently 0/10   Pain at worse 5/10   What makes it worse Worse with walking              Objective:    STRENGTH    Left Right   Hip Flex 4 3-   Ext 4 3-   ABD 4 3+   ADD 4+ 4   Knee Flex 5 4+   Ext 5 5                OBSERVATION No Deficit Deficit Not Tested Comments   Posture       Forward Head [] [x] []    Rounded Shoulders [] [x] []    Gait [] [x] [] Analysis: Trendelenburg gait RLE, decreased overall stride noted          FUNCTION Normal Difficult Unable   Sitting [x] [] []   Standing [] [x] []   Ambulation [] [x] []   Stairs [] [x] []   Bending [] [x] []   Squat [] [x] []       FUNCTIONAL TESTS PAIN NO PAIN COMMENTS   Step Test 4 [] [x]    Squat [] [x]           Flexibility Normal Left tight Right tight   Hip flexor [] [] [x]   quad [] [] [x]   HS [] [] [x]   gastroc [] [] [x]    [] [] []    [] [] []    [] [] []          Functional Test: Oswestry Score: 28% functionally impaired         Assessment:    Patient would benefit from skilled physical therapy services in order to return to full motion and function with ADLs    Problems:    [x] ? Pain  [x] ? ROM  [x] ? Strength        Goals  MET NOT MET ON-  GOING  Details   Date Addressed:        STG: To be met in 10 treatments           1. ? Pain: Decrease pain levels to 4/10 with ADLs []  []  []      2. ? ROM: Increase flexibility and AROM limitations throughout to equal bilat to reduce difficulty with ADLs []  []  []      3. ? Strength: Increase MMT to 5/5 throughout to ease functional limitations and mobility  []  []  []     4. Independent with Home Exercise Programs []  []  []     5.  Demonstrate knowledge of fall risk prevention []  []  []      []  []  []     Date Addressed:        LTG: To be met in 20 treatments       1. Improve score on assessment tool Oswestry from 28% impairment to less than 10% impairment  []  []  []     2. Reduce pain levels to 1/10 or less with ADLs/sport []  []  []      []  []  []                Patient goals: Return to full function     Rehab Potential:  [x] Good  [] Fair  [] Poor   Suggested Professional Referral:  [x] No  [] Yes:  Barriers to Goal Achievement[de-identified]  [x] No  [] Yes:  Domestic Concerns:  [x] No  [] Yes:    Pt. Education:  [x] Plans/Goals, Risks/Benefits discussed  [x] Home exercise program    Method of Education: [x] Verbal  [x] Demo  [] Written  Comprehension of Education:  [x] Verbalizes understanding. [x] Demonstrates understanding. [x] Needs Review. [] Demonstrates/verbalizes understanding of HEP/Ed previously given. Treatment Plan:  [x] Therapeutic Exercise    [] Aquatic Therapy   [x] Manual Therapy     [] Electrical Stimulation  [x] Instruction in HEP      [] Lumbar/Cervical Traction  [x] Neuromuscular Re-education [] Cold/hotpack  [] Iontophoresis: 4 mg/mL  [x] Vasocompression (GameReady)                    Dexamethasone Sodium  [] Gait Training             Phosphate 40-80 mAmin         []  Medication allergies reviewed for use of    Dexamethasone Sodium Phosphate 4mg/ml     with iontophoresis treatments. Pt is not allergic.     Frequency:  2 x/week for 20 visits    Todays Treatment:    Exercise    RLE DELMY  DOS: 9-2-21 Reps/ Time Weight/ Level Comments         Bike             Calf SB Stretch    HEP   Hamstring Stretch    HEP         Clamshells    HEP   Sidelying Hip ABD    HEP         4-way hip    HEP   Total Gym Squats       Total Gym HR      Lunges       Monster walk lateral    // bars                 Specific Instructions for next treatment: Advance hip Abd/ext strength to improve gait function and endurance     Evaluation Complexity:  History (Personal factors, comorbidities) [] 0 [] 1-2 [] 3+   Exam (limitations, restrictions) [x] 1-2 [] 3 [] 4+   Clinical presentation (progression) [x] Stable [] Evolving  [] Unstable   Decision Making [x] Low [] Moderate [] High    [x] Low Complexity [] Moderate Complexity [] High Complexity       Treatment Charges: Mins Units   [x] Evaluation       [x]  Low       []  Moderate       []  High 25 1   []  Modalities     [x]  Ther Exercise 15 1   []  Manual Therapy     []  Ther Activities     []  Aquatics     []  Vasocompression     []  Other       TOTAL TREATMENT TIME: 45    Time in:1100   Time Out:1150    Electronically signed by: Cheri Meyers PT        Physician Signature:________________________________Date:__________________  By signing above or cosigning this note, I have reviewed this plan of care and certify a need for medically necessary rehabilitation services.      *PLEASE SIGN ABOVE AND FAX BACK ALL PAGES*

## 2021-11-09 ENCOUNTER — APPOINTMENT (OUTPATIENT)
Dept: PHYSICAL THERAPY | Facility: CLINIC | Age: 76
End: 2021-11-09
Payer: MEDICARE

## 2021-11-16 ENCOUNTER — HOSPITAL ENCOUNTER (OUTPATIENT)
Dept: PHYSICAL THERAPY | Facility: CLINIC | Age: 76
Setting detail: THERAPIES SERIES
End: 2021-11-16
Payer: MEDICARE

## 2021-11-23 ENCOUNTER — APPOINTMENT (OUTPATIENT)
Dept: PHYSICAL THERAPY | Facility: CLINIC | Age: 76
End: 2021-11-23
Payer: MEDICARE

## 2022-05-04 ENCOUNTER — OFFICE VISIT (OUTPATIENT)
Dept: ORTHOPEDIC SURGERY | Age: 77
End: 2022-05-04
Payer: MEDICARE

## 2022-05-04 DIAGNOSIS — Z96.641 HISTORY OF TOTAL RIGHT HIP REPLACEMENT: Primary | ICD-10-CM

## 2022-05-04 PROCEDURE — G8428 CUR MEDS NOT DOCUMENT: HCPCS | Performed by: ORTHOPAEDIC SURGERY

## 2022-05-04 PROCEDURE — 1123F ACP DISCUSS/DSCN MKR DOCD: CPT | Performed by: ORTHOPAEDIC SURGERY

## 2022-05-04 PROCEDURE — 3017F COLORECTAL CA SCREEN DOC REV: CPT | Performed by: ORTHOPAEDIC SURGERY

## 2022-05-04 PROCEDURE — 1036F TOBACCO NON-USER: CPT | Performed by: ORTHOPAEDIC SURGERY

## 2022-05-04 PROCEDURE — 4040F PNEUMOC VAC/ADMIN/RCVD: CPT | Performed by: ORTHOPAEDIC SURGERY

## 2022-05-04 PROCEDURE — 99213 OFFICE O/P EST LOW 20 MIN: CPT | Performed by: ORTHOPAEDIC SURGERY

## 2022-05-04 PROCEDURE — G8417 CALC BMI ABV UP PARAM F/U: HCPCS | Performed by: ORTHOPAEDIC SURGERY

## 2022-05-04 NOTE — PROGRESS NOTES
Macarthur Schwab M.D.            [address] [facility] [address] [facility],[address], [address] [facility]           Dept Phone: [phone]           Dept Fax:  [phone], [place]          Dept Phone: [phone]           Dept Fax:  [phone]      Chief Compliant:  Chief Complaint   Patient presents with    Pain     9/2/21 Rt ASI        History of Present Illness: This is a 77 y.o. male who presents to the clinic today for evaluation / follow up of status post right total hip September 2021. Patient has no major complaints other than he still does not have all all his strength he states pain wise however he has none. Review of Systems   Constitutional: Negative for fever, chills, sweats. Eyes: Negative for changes in vision, or pain. HENT: Negative for ear ache, epistaxis, or sore throat. Respiratory/Cardio: Negative for Chest pain, palpitations, SOB, or cough. Gastrointestinal: Negative for abdominal pain, N/V/D. Genitourinary: Negative for dysuria, frequency, urgency, or hematuria. Neurological: Negative for headache, numbness, or weakness. Integumentary: Negative for rash, itching, laceration, or abrasion. Musculoskeletal: Positive for Pain (9/2/21 Rt ASI)       Physical Exam:  Constitutional: Patient is oriented to person, place, and time. Patient appears well-developed and well nourished. HENT: Negative otherwise noted  Head: Normocephalic and Atraumatic  Nose: Normal  Eyes: Conjunctivae and EOM are normal  Neck: Normal range of motion Neck supple. Respiratory/Cardio: Effort normal. No respiratory distress. Musculoskeletal: Examination of his right hip notes I can motion his hip with flexion to greater 90 degrees internal rotation 20 X rotation 40 without any discomfort negative FADIR or DANILO.   No trochanteric tenderness slight meralgia paresthetica leg lengths are equal.  Neurological: Patient is alert and oriented to person, place, and time. Normal strenght. No sensory deficit. Skin: Skin is warm and dry  Psychiatric: Behavior is normal. Thought content normal.  Nursing note and vitals reviewed. Labs and Imaging:     XR taken today: No new x-rays taken today  No results found. No orders of the defined types were placed in this encounter. Assessment and Plan:  1. History of total right hip replacement            This is a 77 y.o. male who presents to the clinic today for evaluation / follow up of status post right total hip doing well. Past History:    Current Outpatient Medications:     oxyCODONE-acetaminophen (PERCOCET) 5-325 MG per tablet, , Disp: , Rfl:     aspirin 81 MG chewable tablet, Take 1 tablet by mouth 2 times daily, Disp: 30 tablet, Rfl: 3    ipratropium (ATROVENT) 0.06 % nasal spray, 2 sprays by Each Nostril route 4 times daily as needed for Rhinitis, Disp: , Rfl:     atorvastatin (LIPITOR) 40 MG tablet, Take 40 mg by mouth daily, Disp: , Rfl:     ciclopirox (LOPROX) 0.77 % cream, Apply topically 2 times daily Apply topically 2 times daily. , Disp: , Rfl:     diphenhydrAMINE (BENADRYL) 25 MG capsule, Take 25 mg by mouth every 6 hours as needed for Itching, Disp: , Rfl:     pantoprazole (PROTONIX) 40 MG tablet, Take 40 mg by mouth daily, Disp: , Rfl:     ALPRAZolam (XANAX) 0.25 MG tablet, Take 0.25 mg by mouth nightly as needed for Sleep., Disp: , Rfl:     tiZANidine (ZANAFLEX) 2 MG tablet, Take 2 mg by mouth every 6 hours as needed, Disp: , Rfl:     bisoprolol (ZEBETA) 10 MG tablet, Take 10 mg by mouth daily, Disp: , Rfl:     amLODIPine (NORVASC) 10 MG tablet, Take 10 mg by mouth daily, Disp: , Rfl:     chlorthalidone (HYGROTON) 25 MG tablet, Take 25 mg by mouth daily, Disp: , Rfl:     potassium chloride (KLOR-CON M) 20 MEQ extended release tablet, Take 20 mEq by mouth 2 times daily, Disp: , Rfl:     metFORMIN (GLUCOPHAGE) 500 MG tablet, Take 500 mg by mouth 2 times daily (with meals), Disp: , Rfl:     FLUoxetine (PROZAC) 20 MG capsule, Take 20 mg by mouth daily, Disp: , Rfl:     leuprolide (LUPRON) 45 MG injection, Inject 45 mg into the muscle once, Disp: , Rfl:     oxybutynin (DITROPAN-XL) 10 MG extended release tablet, Take 10 mg by mouth daily, Disp: , Rfl:   No Known Allergies  Social History     Socioeconomic History    Marital status:      Spouse name: Not on file    Number of children: Not on file    Years of education: Not on file    Highest education level: Not on file   Occupational History    Not on file   Tobacco Use    Smoking status: Former Smoker     Quit date:      Years since quittin.3    Smokeless tobacco: Never Used   Vaping Use    Vaping Use: Never used   Substance and Sexual Activity    Alcohol use: Yes     Comment: 4 times weekly    Drug use: Yes     Types: Marijuana (Weed)     Comment: medical marijuana card (CBD & THC oil), last used 21    Sexual activity: Not on file   Other Topics Concern    Not on file   Social History Narrative    Not on file     Social Determinants of Health     Financial Resource Strain:     Difficulty of Paying Living Expenses: Not on file   Food Insecurity:     Worried About Running Out of Food in the Last Year: Not on file    Yusuf of Food in the Last Year: Not on file   Transportation Needs:     Lack of Transportation (Medical): Not on file    Lack of Transportation (Non-Medical):  Not on file   Physical Activity:     Days of Exercise per Week: Not on file    Minutes of Exercise per Session: Not on file   Stress:     Feeling of Stress : Not on file   Social Connections:     Frequency of Communication with Friends and Family: Not on file    Frequency of Social Gatherings with Friends and Family: Not on file    Attends Latter-day Services: Not on file    Active Member of Clubs or Organizations: Not on file    Attends Club or Organization Meetings: Not on file    Marital Status: Not on file   Intimate Partner Violence:     Fear of Current or Ex-Partner: Not on file    Emotionally Abused: Not on file    Physically Abused: Not on file    Sexually Abused: Not on file   Housing Stability:     Unable to Pay for Housing in the Last Year: Not on file    Number of [place] in the Last Year: Not on file    Unstable Housing in the Last Year: Not on file     Past Medical History:   Diagnosis Date    Anxiety     Arthritis     Cancer [facility])     prostate cancer, radiation treatment    Chronic back pain     Depression     DJD (degenerative joint disease)     GERD (gastroesophageal reflux disease)     Hyperlipidemia     Hypertension     Irregular heart beat     Overactive bladder     Pre-diabetes     Skin cancer, basal cell     Sleep apnea     Bipap nightly     Past Surgical History:   Procedure Laterality Date    COLONOSCOPY      ENDOSCOPY, COLON, DIAGNOSTIC      EGD    JOINT REPLACEMENT Left 2019    total hip    KNEE ARTHROSCOPY Left     LIPOMA RESECTION      x 3    PROSTATE BIOPSY      SKIN BIOPSY      x 2    TOTAL HIP ARTHROPLASTY Right 9/2/2021    HIP TOTAL ARTHROPLASTY W/ASI performed by Branden Bahena MD at [facility]      spermacelectomy     No family history on file. Plan  Patient to continue exercises at home. Overall he is very pleased with results. We will see him back in September of this year for first year follow-up. Provider Attestation:  Logan Brock, personally performed the services described in this documentation. All medical record entries made by the scribe were at my direction and in my presence. I have reviewed the chart and discharge instructions and agree that the records reflect my personal performance and is accurate and complete.  Branden Bahena MD. 05/04/22      Please note that this chart was generated using voice recognition Dragon dictation software. Although every effort was made to ensure the accuracy of this automated transcription, some errors in transcription may have occurred.

## 2022-09-07 ENCOUNTER — OFFICE VISIT (OUTPATIENT)
Dept: ORTHOPEDIC SURGERY | Age: 77
End: 2022-09-07
Payer: MEDICARE

## 2022-09-07 DIAGNOSIS — Z96.641 HISTORY OF TOTAL RIGHT HIP REPLACEMENT: Primary | ICD-10-CM

## 2022-09-07 PROCEDURE — G8428 CUR MEDS NOT DOCUMENT: HCPCS | Performed by: ORTHOPAEDIC SURGERY

## 2022-09-07 PROCEDURE — 1123F ACP DISCUSS/DSCN MKR DOCD: CPT | Performed by: ORTHOPAEDIC SURGERY

## 2022-09-07 PROCEDURE — G8421 BMI NOT CALCULATED: HCPCS | Performed by: ORTHOPAEDIC SURGERY

## 2022-09-07 PROCEDURE — 3017F COLORECTAL CA SCREEN DOC REV: CPT | Performed by: ORTHOPAEDIC SURGERY

## 2022-09-07 PROCEDURE — 99213 OFFICE O/P EST LOW 20 MIN: CPT | Performed by: ORTHOPAEDIC SURGERY

## 2022-09-07 PROCEDURE — 1036F TOBACCO NON-USER: CPT | Performed by: ORTHOPAEDIC SURGERY

## 2022-09-07 NOTE — PROGRESS NOTES
Caty Eckert M.D.            [address] [place] [address]., [address] [facility],[address], [address] [facility]           Dept Phone: [phone]           Dept Fax:  [phone]           [place]          Dept Phone: [phone]           Dept Fax:  [phone]      Chief Compliant:  Chief Complaint   Patient presents with    Pain     H/o Rt ASI 9/2/21        History of Present Illness: This is a 77 y.o. male who presents to the clinic today for evaluation / follow up of 1 year status post right total hip. Patient also had a previous left total hip per Dr. Romero Damian several years ago. Patient states he has no complaints whatsoever either hip he is getting around very well. Review of Systems   Constitutional: Negative for fever, chills, sweats. Eyes: Negative for changes in vision, or pain. HENT: Negative for ear ache, epistaxis, or sore throat. Respiratory/Cardio: Negative for Chest pain, palpitations, SOB, or cough. Gastrointestinal: Negative for abdominal pain, N/V/D. Genitourinary: Negative for dysuria, frequency, urgency, or hematuria. Neurological: Negative for headache, numbness, or weakness. Integumentary: Negative for rash, itching, laceration, or abrasion. Musculoskeletal: Positive for Pain (H/o Rt ASI 9/2/21)       Physical Exam:  Constitutional: Patient is oriented to person, place, and time. Patient appears well-developed and well nourished. HENT: Negative otherwise noted  Head: Normocephalic and Atraumatic  Nose: Normal  Eyes: Conjunctivae and EOM are normal  Neck: Normal range of motion Neck supple. Respiratory/Cardio: Effort normal. No respiratory distress.   Musculoskeletal: Examination of the patient's right hip notes that he has motion flexion 90 internal rotation 20 rotation 35 he has no pain on Stinchfield's no trochanteric findings mild meralgia paresthetica leg lengths are equal  Neurological: Patient is alert and oriented to person, place, and time. Normal strength. No sensory deficit. Skin: Skin is warm and dry  Psychiatric: Behavior is normal. Thought content normal.  Nursing note and vitals reviewed. Labs and Imaging:     XR taken today:  XR PELVIS (1-2 VIEWS)    Result Date: 9/7/2022  X-rays taken today reviewed by me show standing AP the pelvis patient is status post bilateral total hips most recently on the right. Patient has a shorter Taperloc stem on the right component appears to be in excellent position on the AP views and excellent fit acetabular position. Patient has a previous left total of arthroplasty with a longer stem Taperloc and components appear to be well centered as well without evidence for any acute or chronic change. Orders Placed This Encounter   Procedures    XR PELVIS (1-2 VIEWS)     Standing Status:   Future     Number of Occurrences:   1     Standing Expiration Date:   9/2/2023       Assessment and Plan:  1. History of total right hip replacement            This is a 77 y.o. male who presents to the clinic today for evaluation / follow up of status post right total hip 1 year. Past History:    Current Outpatient Medications:     oxyCODONE-acetaminophen (PERCOCET) 5-325 MG per tablet, , Disp: , Rfl:     aspirin 81 MG chewable tablet, Take 1 tablet by mouth 2 times daily, Disp: 30 tablet, Rfl: 3    ipratropium (ATROVENT) 0.06 % nasal spray, 2 sprays by Each Nostril route 4 times daily as needed for Rhinitis, Disp: , Rfl:     atorvastatin (LIPITOR) 40 MG tablet, Take 40 mg by mouth daily, Disp: , Rfl:     ciclopirox (LOPROX) 0.77 % cream, Apply topically 2 times daily Apply topically 2 times daily. , Disp: , Rfl:     diphenhydrAMINE (BENADRYL) 25 MG capsule, Take 25 mg by mouth every 6 hours as needed for Itching, Disp: , Rfl:     pantoprazole (PROTONIX) 40 MG tablet, Take 40 mg by mouth daily, Disp: , Rfl:      ALPRAZolam (XANAX) 0.25 MG tablet, Take 0.25 mg by mouth nightly as needed for Sleep., Disp: , Rfl:     tiZANidine (ZANAFLEX) 2 MG tablet, Take 2 mg by mouth every 6 hours as needed, Disp: , Rfl:     bisoprolol (ZEBETA) 10 MG tablet, Take 10 mg by mouth daily, Disp: , Rfl:     amLODIPine (NORVASC) 10 MG tablet, Take 10 mg by mouth daily, Disp: , Rfl:     chlorthalidone (HYGROTON) 25 MG tablet, Take 25 mg by mouth daily, Disp: , Rfl:     potassium chloride (KLOR-CON M) 20 MEQ extended release tablet, Take 20 mEq by mouth 2 times daily, Disp: , Rfl:     metFORMIN (GLUCOPHAGE) 500 MG tablet, Take 500 mg by mouth 2 times daily (with meals), Disp: , Rfl:     FLUoxetine (PROZAC) 20 MG capsule, Take 20 mg by mouth daily, Disp: , Rfl:     leuprolide (LUPRON) 45 MG injection, Inject 45 mg into the muscle once, Disp: , Rfl:     oxybutynin (DITROPAN-XL) 10 MG extended release tablet, Take 10 mg by mouth daily, Disp: , Rfl:   No Known Allergies  Social History     Socioeconomic History    Marital status:      Spouse name: Not on file    Number of children: Not on file    Years of education: Not on file    Highest education level: Not on file   Occupational History    Not on file   Tobacco Use    Smoking status: Former     Types: Cigarettes     Quit date:      Years since quittin.7    Smokeless tobacco: Never   Vaping Use    Vaping Use: Never used   Substance and Sexual Activity    Alcohol use: Yes     Comment: 4 times weekly    Drug use: Yes     Types: Marijuana (Weed)     Comment: medical marijuana card (CBD & THC oil), last used 21    Sexual activity: Not on file   Other Topics Concern    Not on file   Social History Narrative    Not on file     Social Determinants of Health     Financial Resource Strain: Not on file   Food Insecurity: Not on file   Transportation Needs: Not on file   Physical Activity: Not on file   Stress: Not on file   Social Connections: Not on file   Intimate Partner Violence: Not on file   Housing Stability: Not on file     Past Medical History:   Diagnosis Date    Anxiety     Arthritis     Cancer ([facility] [address]     prostate cancer, radiation treatment    Chronic back pain     Depression     DJD (degenerative joint disease)     GERD (gastroesophageal reflux disease)     Hyperlipidemia     Hypertension     Irregular heart beat     Overactive bladder     Pre-diabetes     Skin cancer, basal cell     Sleep apnea     Bipap nightly     Past Surgical History:   Procedure Laterality Date    COLONOSCOPY      ENDOSCOPY, COLON, DIAGNOSTIC      EGD    JOINT REPLACEMENT Left 2019    total hip    KNEE ARTHROSCOPY Left     LIPOMA RESECTION      x 3    PROSTATE BIOPSY      SKIN BIOPSY      x 2    TOTAL HIP ARTHROPLASTY Right 9/2/2021    HIP TOTAL ARTHROPLASTY W/ASI performed by Miguel Malhotra MD at [address] [place] [address]      spermacelecto     No family history on file. Plan  Patient is doing well with both of his hips. I encouraged him to continue exercise program as well as weight loss. Back here in 2 years time    Provider Attestation:  Hallie Sotelo, personally performed the services described in this documentation. All medical record entries made by the scribe were at my direction and in my presence. I have reviewed the chart and discharge instructions and agree that the records reflect my personal performance and is accurate and complete. Miguel Malhotra MD. 09/07/22      Please note that this chart was generated using voice recognition Dragon dictation software. Although every effort was made to ensure the accuracy of this automated transcription, some errors in transcription may have occurred.

## 2023-12-02 ENCOUNTER — HOSPITAL ENCOUNTER (OUTPATIENT)
Dept: SLEEP CENTER | Age: 78
End: 2023-12-02
Payer: MEDICARE

## 2023-12-02 VITALS
WEIGHT: 265 LBS | HEIGHT: 69 IN | HEART RATE: 65 BPM | RESPIRATION RATE: 10 BRPM | OXYGEN SATURATION: 93 % | BODY MASS INDEX: 39.25 KG/M2

## 2023-12-02 PROCEDURE — 95811 POLYSOM 6/>YRS CPAP 4/> PARM: CPT

## 2023-12-09 LAB — STATUS: NORMAL

## 2024-05-02 ENCOUNTER — OFFICE VISIT (OUTPATIENT)
Dept: ORTHOPEDIC SURGERY | Age: 79
End: 2024-05-02
Payer: MEDICARE

## 2024-05-02 VITALS — BODY MASS INDEX: 39.25 KG/M2 | WEIGHT: 265 LBS | RESPIRATION RATE: 14 BRPM | HEIGHT: 69 IN

## 2024-05-02 DIAGNOSIS — Z96.641 HISTORY OF TOTAL RIGHT HIP REPLACEMENT: Primary | ICD-10-CM

## 2024-05-02 PROCEDURE — 99213 OFFICE O/P EST LOW 20 MIN: CPT | Performed by: ORTHOPAEDIC SURGERY

## 2024-05-02 PROCEDURE — 1123F ACP DISCUSS/DSCN MKR DOCD: CPT | Performed by: ORTHOPAEDIC SURGERY

## 2024-05-02 PROCEDURE — 1036F TOBACCO NON-USER: CPT | Performed by: ORTHOPAEDIC SURGERY

## 2024-05-02 PROCEDURE — G8417 CALC BMI ABV UP PARAM F/U: HCPCS | Performed by: ORTHOPAEDIC SURGERY

## 2024-05-02 PROCEDURE — G8427 DOCREV CUR MEDS BY ELIG CLIN: HCPCS | Performed by: ORTHOPAEDIC SURGERY

## 2024-05-02 NOTE — PROGRESS NOTES
times daily Apply topically 2 times daily., Disp: , Rfl:     diphenhydrAMINE (BENADRYL) 25 MG capsule, Take 25 mg by mouth every 6 hours as needed for Itching, Disp: , Rfl:     pantoprazole (PROTONIX) 40 MG tablet, Take 40 mg by mouth daily, Disp: , Rfl:     ALPRAZolam (XANAX) 0.25 MG tablet, Take 0.25 mg by mouth nightly as needed for Sleep., Disp: , Rfl:     tiZANidine (ZANAFLEX) 2 MG tablet, Take 2 mg by mouth every 6 hours as needed, Disp: , Rfl:     bisoprolol (ZEBETA) 10 MG tablet, Take 10 mg by mouth daily, Disp: , Rfl:     amLODIPine (NORVASC) 10 MG tablet, Take 10 mg by mouth daily, Disp: , Rfl:     chlorthalidone (HYGROTON) 25 MG tablet, Take 25 mg by mouth daily, Disp: , Rfl:     potassium chloride (KLOR-CON M) 20 MEQ extended release tablet, Take 20 mEq by mouth 2 times daily, Disp: , Rfl:     metFORMIN (GLUCOPHAGE) 500 MG tablet, Take 500 mg by mouth 2 times daily (with meals), Disp: , Rfl:     FLUoxetine (PROZAC) 20 MG capsule, Take 20 mg by mouth daily, Disp: , Rfl:     leuprolide (LUPRON) 45 MG injection, Inject 45 mg into the muscle once, Disp: , Rfl:     oxybutynin (DITROPAN-XL) 10 MG extended release tablet, Take 10 mg by mouth daily, Disp: , Rfl:   No Known Allergies  Social History     Socioeconomic History    Marital status:      Spouse name: Not on file    Number of children: Not on file    Years of education: Not on file    Highest education level: Not on file   Occupational History    Not on file   Tobacco Use    Smoking status: Former     Current packs/day: 0.00     Types: Cigarettes     Quit date:      Years since quittin.3    Smokeless tobacco: Never   Vaping Use    Vaping Use: Never used   Substance and Sexual Activity    Alcohol use: Yes     Comment: 4 times weekly    Drug use: Yes     Types: Marijuana (Weed)     Comment: medical marijuana card (CBD & THC oil), last used 22    Sexual activity: Not on file   Other Topics Concern    Not on file   Social History

## (undated) DEVICE — Device

## (undated) DEVICE — DEVICE CLOSURE SKIN SURG

## (undated) DEVICE — GLOVE ORTHO 8   MSG9480

## (undated) DEVICE — 450 ML BOTTLE OF 0.05% CHLORHEXIDINE GLUCONATE IN 99.95% STERILE WATER FOR IRRIGATION, USP AND APPLICATOR.: Brand: IRRISEPT ANTIMICROBIAL WOUND LAVAGE

## (undated) DEVICE — MASTISOL ADHESIVE LIQ 2/3ML

## (undated) DEVICE — SOLUTION IRRIG 1000ML 0.9% SOD CHL USP POUR PLAS BTL

## (undated) DEVICE — DRESSING KIT 13 CM PREVENA

## (undated) DEVICE — SUTURE STRATAFIX SPRL MCRYL + SZ 2 0 L27IN ABSRB UD W NDL SXMP1B419

## (undated) DEVICE — SUTURE STRATAFIX SYMMETRIC PDS + SZ 1 L18IN ABSRB VLT L48MM SXPP1A400

## (undated) DEVICE — SOLUTION IRRIG 1000ML STRL H2O USP PLAS POUR BTL

## (undated) DEVICE — KIT AUTOTRNS APPL AERO 2 SET SYR 2 TIP FOR PLT SEP SYS GPS

## (undated) DEVICE — KIT SEP W/ BLD DRAW TB SYR NDL TRNQT PD

## (undated) DEVICE — BLANKET WRM W29.9XL79.1IN UP BODY FORC AIR MISTRAL-AIR

## (undated) DEVICE — DUAL CUT SAGITTAL BLADE

## (undated) DEVICE — GLOVE ORANGE PI 8 1/2   MSG9085

## (undated) DEVICE — MERCY HEALTH ST CHARLES: Brand: MEDLINE INDUSTRIES, INC.

## (undated) DEVICE — 4-PORT MANIFOLD: Brand: NEPTUNE 2

## (undated) DEVICE — SOLUTION IRRIG 3000ML 0.9% SOD CHL USP UROMATIC PLAS CONT

## (undated) DEVICE — BNDG,ELSTC,MATRIX,STRL,6"X5YD,LF,HOOK&LP: Brand: MEDLINE

## (undated) DEVICE — STOCKINETTE ORTH W6XL48IN OFF WHT SGL PLY UNBLEACHED COT RIB